# Patient Record
Sex: MALE | Race: WHITE | NOT HISPANIC OR LATINO | Employment: OTHER | URBAN - METROPOLITAN AREA
[De-identification: names, ages, dates, MRNs, and addresses within clinical notes are randomized per-mention and may not be internally consistent; named-entity substitution may affect disease eponyms.]

---

## 2019-01-07 ENCOUNTER — TELEPHONE (OUTPATIENT)
Dept: GASTROENTEROLOGY | Facility: CLINIC | Age: 64
End: 2019-01-07

## 2019-01-07 NOTE — TELEPHONE ENCOUNTER
----- Message from Robbin Thompson sent at 1/7/2019 11:22 AM CST -----  Contact: Arpita's wife  She is calling in regards to whether or not a referral was received ,please advise [phone]

## 2019-01-09 ENCOUNTER — LAB VISIT (OUTPATIENT)
Dept: LAB | Facility: HOSPITAL | Age: 64
End: 2019-01-09
Attending: INTERNAL MEDICINE
Payer: COMMERCIAL

## 2019-01-09 ENCOUNTER — OFFICE VISIT (OUTPATIENT)
Dept: GASTROENTEROLOGY | Facility: CLINIC | Age: 64
End: 2019-01-09
Payer: COMMERCIAL

## 2019-01-09 VITALS
SYSTOLIC BLOOD PRESSURE: 136 MMHG | HEART RATE: 84 BPM | DIASTOLIC BLOOD PRESSURE: 80 MMHG | BODY MASS INDEX: 30.09 KG/M2 | WEIGHT: 227.06 LBS | HEIGHT: 73 IN

## 2019-01-09 DIAGNOSIS — K74.69 OTHER CIRRHOSIS OF LIVER: ICD-10-CM

## 2019-01-09 DIAGNOSIS — K74.69 OTHER CIRRHOSIS OF LIVER: Primary | ICD-10-CM

## 2019-01-09 PROBLEM — K70.30 ALCOHOLIC CIRRHOSIS OF LIVER WITHOUT ASCITES: Status: ACTIVE | Noted: 2019-01-09

## 2019-01-09 LAB
ALBUMIN SERPL BCP-MCNC: 4.3 G/DL
ALP SERPL-CCNC: 64 U/L
ALT SERPL W/O P-5'-P-CCNC: 25 U/L
ANION GAP SERPL CALC-SCNC: 10 MMOL/L
AST SERPL-CCNC: 27 U/L
BASOPHILS # BLD AUTO: 0.04 K/UL
BASOPHILS NFR BLD: 1 %
BILIRUB SERPL-MCNC: 1.3 MG/DL
BUN SERPL-MCNC: 18 MG/DL
CALCIUM SERPL-MCNC: 10 MG/DL
CHLORIDE SERPL-SCNC: 100 MMOL/L
CO2 SERPL-SCNC: 29 MMOL/L
CREAT SERPL-MCNC: 1.1 MG/DL
DIFFERENTIAL METHOD: ABNORMAL
EOSINOPHIL # BLD AUTO: 0.1 K/UL
EOSINOPHIL NFR BLD: 2.1 %
ERYTHROCYTE [DISTWIDTH] IN BLOOD BY AUTOMATED COUNT: 13.4 %
EST. GFR  (AFRICAN AMERICAN): >60 ML/MIN/1.73 M^2
EST. GFR  (NON AFRICAN AMERICAN): >60 ML/MIN/1.73 M^2
GLUCOSE SERPL-MCNC: 114 MG/DL
HCT VFR BLD AUTO: 43.5 %
HGB BLD-MCNC: 13.8 G/DL
IMM GRANULOCYTES # BLD AUTO: 0.02 K/UL
IMM GRANULOCYTES NFR BLD AUTO: 0.5 %
INR PPP: 1
LYMPHOCYTES # BLD AUTO: 1.6 K/UL
LYMPHOCYTES NFR BLD: 37.3 %
MCH RBC QN AUTO: 29.6 PG
MCHC RBC AUTO-ENTMCNC: 31.7 G/DL
MCV RBC AUTO: 93 FL
MONOCYTES # BLD AUTO: 0.3 K/UL
MONOCYTES NFR BLD: 7.6 %
NEUTROPHILS # BLD AUTO: 2.2 K/UL
NEUTROPHILS NFR BLD: 51.5 %
NRBC BLD-RTO: 0 /100 WBC
PLATELET # BLD AUTO: 116 K/UL
PMV BLD AUTO: 11.7 FL
POTASSIUM SERPL-SCNC: 4.2 MMOL/L
PROT SERPL-MCNC: 7.6 G/DL
PROTHROMBIN TIME: 10.4 SEC
RBC # BLD AUTO: 4.67 M/UL
SODIUM SERPL-SCNC: 139 MMOL/L
WBC # BLD AUTO: 4.21 K/UL

## 2019-01-09 PROCEDURE — 99999 PR PBB SHADOW E&M-EST. PATIENT-LVL III: ICD-10-PCS | Mod: PBBFAC,,, | Performed by: INTERNAL MEDICINE

## 2019-01-09 PROCEDURE — 86790 VIRUS ANTIBODY NOS: CPT

## 2019-01-09 PROCEDURE — 85610 PROTHROMBIN TIME: CPT

## 2019-01-09 PROCEDURE — 36415 COLL VENOUS BLD VENIPUNCTURE: CPT

## 2019-01-09 PROCEDURE — 99204 PR OFFICE/OUTPT VISIT, NEW, LEVL IV, 45-59 MIN: ICD-10-PCS | Mod: S$GLB,,, | Performed by: INTERNAL MEDICINE

## 2019-01-09 PROCEDURE — 99204 OFFICE O/P NEW MOD 45 MIN: CPT | Mod: S$GLB,,, | Performed by: INTERNAL MEDICINE

## 2019-01-09 PROCEDURE — 85025 COMPLETE CBC W/AUTO DIFF WBC: CPT

## 2019-01-09 PROCEDURE — 3008F PR BODY MASS INDEX (BMI) DOCUMENTED: ICD-10-PCS | Mod: CPTII,S$GLB,, | Performed by: INTERNAL MEDICINE

## 2019-01-09 PROCEDURE — 87340 HEPATITIS B SURFACE AG IA: CPT

## 2019-01-09 PROCEDURE — 80053 COMPREHEN METABOLIC PANEL: CPT

## 2019-01-09 PROCEDURE — 3008F BODY MASS INDEX DOCD: CPT | Mod: CPTII,S$GLB,, | Performed by: INTERNAL MEDICINE

## 2019-01-09 PROCEDURE — 82105 ALPHA-FETOPROTEIN SERUM: CPT

## 2019-01-09 PROCEDURE — 99999 PR PBB SHADOW E&M-EST. PATIENT-LVL III: CPT | Mod: PBBFAC,,, | Performed by: INTERNAL MEDICINE

## 2019-01-09 PROCEDURE — 86706 HEP B SURFACE ANTIBODY: CPT

## 2019-01-09 PROCEDURE — 86803 HEPATITIS C AB TEST: CPT

## 2019-01-09 RX ORDER — TRAZODONE HYDROCHLORIDE 150 MG/1
150 TABLET ORAL NIGHTLY
COMMUNITY
End: 2019-07-24 | Stop reason: DRUGHIGH

## 2019-01-09 RX ORDER — SERTRALINE HYDROCHLORIDE 100 MG/1
100 TABLET, FILM COATED ORAL 2 TIMES DAILY
COMMUNITY

## 2019-01-09 RX ORDER — LOSARTAN POTASSIUM 50 MG/1
50 TABLET ORAL DAILY
COMMUNITY

## 2019-01-09 RX ORDER — SIMVASTATIN 40 MG/1
40 TABLET, FILM COATED ORAL NIGHTLY
COMMUNITY

## 2019-01-09 RX ORDER — AMOXICILLIN 500 MG
CAPSULE ORAL DAILY
COMMUNITY

## 2019-01-09 NOTE — PROGRESS NOTES
Subjective:     Mike Cobos is here for evaluation of Cirrhosis      HPI  Mike Cobos here for evaluation and management of cirrhosis.  Has risk factors for both BERENICE and VALLE. Reports DM well controlled. Also with cholesterol issues. Quit EtOH 20 years ago. Overall main issue is fatigue but otherwise ok.    No evidence of liver decompensation: no ascites, confusion or GI bleeding.    Reports having US recently      Outside records reviewed    Upper endoscopy from August 2017 shows normal EGD.  No sign of varices    Colonoscopy from August 2017 shows for ascending and 1 transverse colon polyp all removed.  The largest was 6 mm.    Review of Systems   Constitutional: Positive for fatigue.   HENT: Negative.    Eyes: Negative.    Respiratory: Negative.    Cardiovascular: Negative.    Gastrointestinal: Negative.    Genitourinary: Negative.    Musculoskeletal: Negative.    Skin: Negative.    Neurological: Negative.    Psychiatric/Behavioral: Negative.        Objective:     Physical Exam   Constitutional: He is oriented to person, place, and time. He appears well-developed and well-nourished. No distress.   HENT:   Head: Normocephalic and atraumatic.   Mouth/Throat: Oropharynx is clear and moist. No oropharyngeal exudate.   Eyes: Conjunctivae are normal. Pupils are equal, round, and reactive to light. Right eye exhibits no discharge. Left eye exhibits no discharge. No scleral icterus.   Pulmonary/Chest: Effort normal and breath sounds normal. No respiratory distress. He has no wheezes.   Abdominal: Soft. He exhibits no distension. There is no tenderness.   Musculoskeletal: He exhibits no edema.   Neurological: He is alert and oriented to person, place, and time.   Psychiatric: He has a normal mood and affect. His behavior is normal.   Vitals reviewed.      Computed MELD-Na score unavailable. Necessary lab results were not found in the last year.  Computed MELD score unavailable. Necessary lab results were not  found in the last year.    No results found for: WBC, HGB, HCT, PLT  No results found for: BUN, CREATININE, GLU, CALCIUM, NA, K, CL, MG  No results found for: AST, ALT, ALKPHOS, BILITOT, ALBUMIN  No results found for: INR      Assessment/Plan:     1. Other cirrhosis of liver      Mike Cobos is a 63 y.o. male withCirrhosis    Other cirrhosis of liver- likely VALLE +/- BERENICE although EtOH was remote. Appears compensated  -Will eval MELD score  -Discussed, cirrhosis, management and prognosis  -Need to get copy of outside US  -     Comprehensive metabolic panel; Future; Expected date: 01/09/2019  -     CBC auto differential; Future; Expected date: 01/09/2019  -     AFP tumor marker; Future; Expected date: 01/09/2019  -     Protime-INR; Future; Expected date: 01/09/2019  -     Hepatitis B surface antibody; Future; Expected date: 01/09/2019  -     Hepatitis A antibody, IgG; Future; Expected date: 01/09/2019  -     Hepatitis B surface antigen; Future; Expected date: 01/09/2019  -     Hepatitis C antibody; Future; Expected date: 01/09/2019  -     Comprehensive metabolic panel; Future; Expected date: 01/09/2019  -     CBC auto differential; Future; Expected date: 01/09/2019  -     AFP tumor marker; Future; Expected date: 01/09/2019  -     Protime-INR; Future; Expected date: 01/09/2019  -     US Abdomen Limited; Future; Expected date: 01/09/2019    RTC in 6 months with preclinic labs and imaging      Erinn Dorantes MD

## 2019-01-10 ENCOUNTER — TELEPHONE (OUTPATIENT)
Dept: GASTROENTEROLOGY | Facility: CLINIC | Age: 64
End: 2019-01-10

## 2019-01-10 LAB
AFP SERPL-MCNC: 3.4 NG/ML
HBV SURFACE AB SER-ACNC: NEGATIVE M[IU]/ML
HBV SURFACE AG SERPL QL IA: NEGATIVE
HCV AB SERPL QL IA: NEGATIVE
HEPATITIS A ANTIBODY, IGG: NEGATIVE

## 2019-01-10 NOTE — TELEPHONE ENCOUNTER
Returned call to [facility] and clarified patient is having an ultrasound of the abdomen. Patient was schedule.

## 2019-01-10 NOTE — TELEPHONE ENCOUNTER
----- Message from Zandra Silver sent at 1/10/2019  9:29 AM CST -----  Contact: Dr Yang([facility])-[phone]  Would like to consult with nurse regarding office notes for this patient on 01-09-19, please call back at [phone]. Thanks/ar

## 2019-01-23 ENCOUNTER — PATIENT MESSAGE (OUTPATIENT)
Dept: TRANSPLANT | Facility: CLINIC | Age: 64
End: 2019-01-23

## 2019-01-23 DIAGNOSIS — D64.9 NORMOCYTIC ANEMIA: Primary | ICD-10-CM

## 2019-01-25 ENCOUNTER — TELEPHONE (OUTPATIENT)
Dept: TRANSPLANT | Facility: CLINIC | Age: 64
End: 2019-01-25

## 2019-01-30 ENCOUNTER — TELEPHONE (OUTPATIENT)
Dept: GASTROENTEROLOGY | Facility: CLINIC | Age: 64
End: 2019-01-30

## 2019-01-30 NOTE — TELEPHONE ENCOUNTER
Called pt and spoke to his wife - told that Dr. Dorantes had received his recent U/S and it showed his Cirrhosis, but no concerning lesions.  He is going to get his Hep A and B vaccines in [place].  Will scan U/S result into chart.

## 2019-02-05 ENCOUNTER — TELEPHONE (OUTPATIENT)
Dept: GASTROENTEROLOGY | Facility: CLINIC | Age: 64
End: 2019-02-05

## 2019-02-05 NOTE — TELEPHONE ENCOUNTER
----- Message from Karen Johnson sent at 2/5/2019  8:37 AM CST -----  Contact: [place]/Dr. Juan's office  [place] called and stated they referred this patient and she requested notes on 1/9/19 and she never received them. She would like the office notes for this patient.    Fax number [phone].    She can be contacted at [phone].    Thanks,  Karen

## 2019-04-29 ENCOUNTER — TELEPHONE (OUTPATIENT)
Dept: GASTROENTEROLOGY | Facility: CLINIC | Age: 64
End: 2019-04-29

## 2019-04-29 NOTE — TELEPHONE ENCOUNTER
callled and spoke to wife appt rescheduled    ----- Message from Bayron Mejia sent at 4/29/2019  3:59 PM CDT -----  Contact: [facility]-[phone]  Would like a nurse to contact her regarding change in her  appointment, please contact her @[phone]. Thanks

## 2019-06-13 ENCOUNTER — TELEPHONE (OUTPATIENT)
Dept: GASTROENTEROLOGY | Facility: CLINIC | Age: 64
End: 2019-06-13

## 2019-06-13 DIAGNOSIS — K74.69 OTHER CIRRHOSIS OF LIVER: Primary | ICD-10-CM

## 2019-06-13 NOTE — TELEPHONE ENCOUNTER
Wife phoned requests that labs be done same day as appt on July 24 because they are driving from far away. She also requested an outside order for an ultrasound because they always have one done before their appointment locally. Can you change the lab orders to stat, and does he need u/s this time?     Thank you.

## 2019-06-20 ENCOUNTER — TELEPHONE (OUTPATIENT)
Dept: GASTROENTEROLOGY | Facility: CLINIC | Age: 64
End: 2019-06-20

## 2019-06-20 DIAGNOSIS — K74.69 OTHER CIRRHOSIS OF LIVER: Primary | ICD-10-CM

## 2019-06-20 NOTE — TELEPHONE ENCOUNTER
Patient needs outside order for ultrasound that is needed mid July  so that he can have done at [facility] phone number [phone], fax [phone]    ----- Message from RT Catracho sent at 6/20/2019 10:11 AM CDT -----  Contact: Radiology  Patient wife said he needs to get US test done in [facility], because patient can not fast that long. Please call patient back to discuss.

## 2019-06-26 ENCOUNTER — TELEPHONE (OUTPATIENT)
Dept: GASTROENTEROLOGY | Facility: CLINIC | Age: 64
End: 2019-06-26

## 2019-06-26 NOTE — TELEPHONE ENCOUNTER
U/s scheduled 7/16----- Message from Griselda Greenfield sent at 6/26/2019 10:12 AM CDT -----  Contact: Kathrine Chandrika   States she's calling regarding an US for his liver, before his appt with Dr Dorantes. He would like to have the US done [facility], [phone]. Please call Kathrine Cobos at [phone]. Thank you

## 2019-07-24 ENCOUNTER — LAB VISIT (OUTPATIENT)
Dept: LAB | Facility: HOSPITAL | Age: 64
End: 2019-07-24
Attending: INTERNAL MEDICINE
Payer: COMMERCIAL

## 2019-07-24 ENCOUNTER — OFFICE VISIT (OUTPATIENT)
Dept: GASTROENTEROLOGY | Facility: CLINIC | Age: 64
End: 2019-07-24
Attending: INTERNAL MEDICINE
Payer: COMMERCIAL

## 2019-07-24 VITALS
DIASTOLIC BLOOD PRESSURE: 62 MMHG | HEART RATE: 60 BPM | BODY MASS INDEX: 29.51 KG/M2 | SYSTOLIC BLOOD PRESSURE: 118 MMHG | WEIGHT: 222.69 LBS | HEIGHT: 73 IN

## 2019-07-24 DIAGNOSIS — R11.0 NAUSEA: ICD-10-CM

## 2019-07-24 DIAGNOSIS — K74.69 OTHER CIRRHOSIS OF LIVER: Primary | ICD-10-CM

## 2019-07-24 DIAGNOSIS — K74.69 OTHER CIRRHOSIS OF LIVER: ICD-10-CM

## 2019-07-24 DIAGNOSIS — D64.9 NORMOCYTIC ANEMIA: ICD-10-CM

## 2019-07-24 LAB
AFP SERPL-MCNC: 3.8 NG/ML (ref 0–8.4)
ALBUMIN SERPL BCP-MCNC: 4.3 G/DL (ref 3.5–5.2)
ALP SERPL-CCNC: 61 U/L (ref 55–135)
ALT SERPL W/O P-5'-P-CCNC: 29 U/L (ref 10–44)
ANION GAP SERPL CALC-SCNC: 9 MMOL/L (ref 8–16)
AST SERPL-CCNC: 28 U/L (ref 10–40)
BASOPHILS # BLD AUTO: 0.02 K/UL (ref 0–0.2)
BASOPHILS NFR BLD: 0.4 % (ref 0–1.9)
BILIRUB SERPL-MCNC: 1.4 MG/DL (ref 0.1–1)
BUN SERPL-MCNC: 19 MG/DL (ref 8–23)
CALCIUM SERPL-MCNC: 10.4 MG/DL (ref 8.7–10.5)
CHLORIDE SERPL-SCNC: 102 MMOL/L (ref 95–110)
CO2 SERPL-SCNC: 28 MMOL/L (ref 23–29)
CREAT SERPL-MCNC: 1.1 MG/DL (ref 0.5–1.4)
DIFFERENTIAL METHOD: ABNORMAL
EOSINOPHIL # BLD AUTO: 0.1 K/UL (ref 0–0.5)
EOSINOPHIL NFR BLD: 1 % (ref 0–8)
ERYTHROCYTE [DISTWIDTH] IN BLOOD BY AUTOMATED COUNT: 13.5 % (ref 11.5–14.5)
EST. GFR  (AFRICAN AMERICAN): >60 ML/MIN/1.73 M^2
EST. GFR  (NON AFRICAN AMERICAN): >60 ML/MIN/1.73 M^2
FERRITIN SERPL-MCNC: 38 NG/ML (ref 20–300)
FOLATE SERPL-MCNC: 12.7 NG/ML (ref 4–24)
GLUCOSE SERPL-MCNC: 102 MG/DL (ref 70–110)
HCT VFR BLD AUTO: 44 % (ref 40–54)
HGB BLD-MCNC: 14.1 G/DL (ref 14–18)
IMM GRANULOCYTES # BLD AUTO: 0.01 K/UL (ref 0–0.04)
IMM GRANULOCYTES NFR BLD AUTO: 0.2 % (ref 0–0.5)
INR PPP: 1 (ref 0.8–1.2)
IRON SERPL-MCNC: 107 UG/DL (ref 45–160)
LYMPHOCYTES # BLD AUTO: 1.3 K/UL (ref 1–4.8)
LYMPHOCYTES NFR BLD: 26.1 % (ref 18–48)
MCH RBC QN AUTO: 30.1 PG (ref 27–31)
MCHC RBC AUTO-ENTMCNC: 32 G/DL (ref 32–36)
MCV RBC AUTO: 94 FL (ref 82–98)
MONOCYTES # BLD AUTO: 0.5 K/UL (ref 0.3–1)
MONOCYTES NFR BLD: 9 % (ref 4–15)
NEUTROPHILS # BLD AUTO: 3.2 K/UL (ref 1.8–7.7)
NEUTROPHILS NFR BLD: 63.5 % (ref 38–73)
NRBC BLD-RTO: 0 /100 WBC
PLATELET # BLD AUTO: 133 K/UL (ref 150–350)
PMV BLD AUTO: 10.1 FL (ref 9.2–12.9)
POTASSIUM SERPL-SCNC: 4.1 MMOL/L (ref 3.5–5.1)
PROT SERPL-MCNC: 7.5 G/DL (ref 6–8.4)
PROTHROMBIN TIME: 10.4 SEC (ref 9–12.5)
RBC # BLD AUTO: 4.68 M/UL (ref 4.6–6.2)
SATURATED IRON: 27 % (ref 20–50)
SODIUM SERPL-SCNC: 139 MMOL/L (ref 136–145)
TOTAL IRON BINDING CAPACITY: 391 UG/DL (ref 250–450)
TRANSFERRIN SERPL-MCNC: 264 MG/DL (ref 200–375)
VIT B12 SERPL-MCNC: 319 PG/ML (ref 210–950)
WBC # BLD AUTO: 5.1 K/UL (ref 3.9–12.7)

## 2019-07-24 PROCEDURE — 99213 PR OFFICE/OUTPT VISIT, EST, LEVL III, 20-29 MIN: ICD-10-PCS | Mod: S$GLB,,, | Performed by: INTERNAL MEDICINE

## 2019-07-24 PROCEDURE — 80053 COMPREHEN METABOLIC PANEL: CPT

## 2019-07-24 PROCEDURE — 82746 ASSAY OF FOLIC ACID SERUM: CPT

## 2019-07-24 PROCEDURE — 99999 PR PBB SHADOW E&M-EST. PATIENT-LVL III: CPT | Mod: PBBFAC,,, | Performed by: INTERNAL MEDICINE

## 2019-07-24 PROCEDURE — 36415 COLL VENOUS BLD VENIPUNCTURE: CPT

## 2019-07-24 PROCEDURE — 82728 ASSAY OF FERRITIN: CPT

## 2019-07-24 PROCEDURE — 99999 PR PBB SHADOW E&M-EST. PATIENT-LVL III: ICD-10-PCS | Mod: PBBFAC,,, | Performed by: INTERNAL MEDICINE

## 2019-07-24 PROCEDURE — 82607 VITAMIN B-12: CPT

## 2019-07-24 PROCEDURE — 3008F BODY MASS INDEX DOCD: CPT | Mod: CPTII,S$GLB,, | Performed by: INTERNAL MEDICINE

## 2019-07-24 PROCEDURE — 85610 PROTHROMBIN TIME: CPT

## 2019-07-24 PROCEDURE — 83540 ASSAY OF IRON: CPT

## 2019-07-24 PROCEDURE — 85025 COMPLETE CBC W/AUTO DIFF WBC: CPT

## 2019-07-24 PROCEDURE — 3008F PR BODY MASS INDEX (BMI) DOCUMENTED: ICD-10-PCS | Mod: CPTII,S$GLB,, | Performed by: INTERNAL MEDICINE

## 2019-07-24 PROCEDURE — 99213 OFFICE O/P EST LOW 20 MIN: CPT | Mod: S$GLB,,, | Performed by: INTERNAL MEDICINE

## 2019-07-24 PROCEDURE — 82105 ALPHA-FETOPROTEIN SERUM: CPT

## 2019-07-24 RX ORDER — ONDANSETRON 8 MG/1
8 TABLET, ORALLY DISINTEGRATING ORAL DAILY PRN
Qty: 12 TABLET | Refills: 2 | Status: SHIPPED | OUTPATIENT
Start: 2019-07-24

## 2019-07-24 RX ORDER — AZELASTINE HYDROCHLORIDE, FLUTICASONE PROPIONATE 137; 50 UG/1; UG/1
1 SPRAY, METERED NASAL 2 TIMES DAILY
COMMUNITY

## 2019-07-24 RX ORDER — CLONAZEPAM 0.25 MG/1
TABLET, ORALLY DISINTEGRATING ORAL
COMMUNITY

## 2019-07-24 RX ORDER — ASPIRIN 81 MG/1
81 TABLET ORAL DAILY
COMMUNITY
End: 2022-07-12

## 2019-07-24 RX ORDER — CHOLECALCIFEROL (VITAMIN D3) 25 MCG
2000 TABLET ORAL DAILY
COMMUNITY

## 2019-07-24 NOTE — PROGRESS NOTES
Subjective:     Mike Cobos is here for follow up of cirrhosis.    HPI  Since Mike Cobos's last visit the main issues have been:  Previous issues with nausea have improved since last visit.  He reports changing his diet.  He is eating less greasy and fatty foods.  He has also lost 5 lb since last visit.  He does still occasionally have nausea but is very rare, episodic.    Ascites-none  Encephalopathy-none  GI bleeding-none    Outside ultra scan reviewed 7/16/2019    Diffuse fibrofatty infiltration of the liver with no focal liver abnormality, sludge and stones within the gallbladder lumen.    Review of Systems    Objective:     Physical Exam   Constitutional: He is oriented to person, place, and time. He appears well-developed and well-nourished. No distress.   HENT:   Head: Normocephalic and atraumatic.   Mouth/Throat: Oropharynx is clear and moist. No oropharyngeal exudate.   Eyes: Pupils are equal, round, and reactive to light. Conjunctivae are normal. Right eye exhibits no discharge. Left eye exhibits no discharge. No scleral icterus.   Pulmonary/Chest: Effort normal and breath sounds normal. No respiratory distress. He has no wheezes.   Abdominal: Soft. He exhibits no distension. There is no tenderness.   Musculoskeletal: He exhibits no edema.   Neurological: He is alert and oriented to person, place, and time.   Psychiatric: He has a normal mood and affect. His behavior is normal.   Vitals reviewed.      MELD-Na score: 9 at 7/24/2019 10:59 AM  MELD score: 9 at 7/24/2019 10:59 AM  Calculated from:  Serum Creatinine: 1.1 mg/dL at 7/24/2019 10:59 AM  Serum Sodium: 139 mmol/L (Rounded to 137 mmol/L) at 7/24/2019 10:59 AM  Total Bilirubin: 1.4 mg/dL at 7/24/2019 10:59 AM  INR(ratio): 1.0 at 7/24/2019 10:59 AM  Age: 64 years    WBC   Date Value Ref Range Status   07/24/2019 5.10 3.90 - 12.70 K/uL Final     Hemoglobin   Date Value Ref Range Status   07/24/2019 14.1 14.0 - 18.0 g/dL Final      Hematocrit   Date Value Ref Range Status   07/24/2019 44.0 40.0 - 54.0 % Final     Platelets   Date Value Ref Range Status   07/24/2019 133 (L) 150 - 350 K/uL Final     BUN, Bld   Date Value Ref Range Status   07/24/2019 19 8 - 23 mg/dL Final     Creatinine   Date Value Ref Range Status   07/24/2019 1.1 0.5 - 1.4 mg/dL Final     Glucose   Date Value Ref Range Status   07/24/2019 102 70 - 110 mg/dL Final     Calcium   Date Value Ref Range Status   07/24/2019 10.4 8.7 - 10.5 mg/dL Final     Sodium   Date Value Ref Range Status   07/24/2019 139 136 - 145 mmol/L Final     Potassium   Date Value Ref Range Status   07/24/2019 4.1 3.5 - 5.1 mmol/L Final     Chloride   Date Value Ref Range Status   07/24/2019 102 95 - 110 mmol/L Final     AST   Date Value Ref Range Status   07/24/2019 28 10 - 40 U/L Final     ALT   Date Value Ref Range Status   07/24/2019 29 10 - 44 U/L Final     Alkaline Phosphatase   Date Value Ref Range Status   07/24/2019 61 55 - 135 U/L Final     Total Bilirubin   Date Value Ref Range Status   07/24/2019 1.4 (H) 0.1 - 1.0 mg/dL Final     Comment:     For infants and newborns, interpretation of results should be based  on gestational age, weight and in agreement with clinical  observations.  Premature Infant recommended reference ranges:  Up to 24 hours.............<8.0 mg/dL  Up to 48 hours............<12.0 mg/dL  3-5 days..................<15.0 mg/dL  6-29 days.................<15.0 mg/dL       Albumin   Date Value Ref Range Status   07/24/2019 4.3 3.5 - 5.2 g/dL Final     INR   Date Value Ref Range Status   07/24/2019 1.0 0.8 - 1.2 Final     Comment:     Coumadin Therapy:  2.0 - 3.0 for INR for all indicators except mechanical heart valves  and antiphospholipid syndromes which should use 2.5 - 3.5.           Assessment/Plan:     1. Other cirrhosis of liver    2. Nausea      Mike Cobos is a 64 y.o. male withCirrhosis    Cirrhosis-low meld, well compensated  -Continue to monitor MELD  labs  -Continue HCC surveillance-can be done locally prior to next visit  -Continue variceal surveillance-8/2020    Nausea-nonspecific may be related to gallbladder but could also be related to reflux.  Overall this seems to be improving.  -advised to continue to monitor symptoms  -can take PPI is started to become more regular but now it is episodic  -Zofran as needed for nausea    Return to clinic in 6 months with preclinic labs and imaging    Erinn Dorantes MD

## 2020-01-20 ENCOUNTER — TELEPHONE (OUTPATIENT)
Dept: GASTROENTEROLOGY | Facility: CLINIC | Age: 65
End: 2020-01-20

## 2020-01-20 NOTE — TELEPHONE ENCOUNTER
Spoke with patient wife Kathrine and made her aware that I would fax patient ultrasound orders to [facility] in [place], [place]. Kathrine verbalized understanding.     Orders faxed to [phone].

## 2020-01-20 NOTE — TELEPHONE ENCOUNTER
----- Message from Stella Rodríguez sent at 1/20/2020 11:11 AM CST -----  Contact: Lis Scheduling  Requesting an order for US of liver. Patient is scheduled for 1/21/20. Please call back at [phone]. Please  Fax order to [phone].    Thanks,  Stella Rodríguez

## 2020-01-23 ENCOUNTER — DOCUMENTATION ONLY (OUTPATIENT)
Dept: GASTROENTEROLOGY | Facility: CLINIC | Age: 65
End: 2020-01-23

## 2020-01-24 NOTE — PROGRESS NOTES
Outside ultrasound reviewed from January 21, 2020 shows diffuse fibrofatty infiltration of the liver.  Stones and sludge within the gallbladder.  No focal liver abnormalities.

## 2020-01-29 ENCOUNTER — TELEPHONE (OUTPATIENT)
Dept: GASTROENTEROLOGY | Facility: CLINIC | Age: 65
End: 2020-01-29

## 2020-01-29 ENCOUNTER — LAB VISIT (OUTPATIENT)
Dept: LAB | Facility: HOSPITAL | Age: 65
End: 2020-01-29
Attending: INTERNAL MEDICINE
Payer: COMMERCIAL

## 2020-01-29 ENCOUNTER — OFFICE VISIT (OUTPATIENT)
Dept: GASTROENTEROLOGY | Facility: CLINIC | Age: 65
End: 2020-01-29
Payer: COMMERCIAL

## 2020-01-29 VITALS
HEART RATE: 69 BPM | SYSTOLIC BLOOD PRESSURE: 148 MMHG | HEIGHT: 73 IN | DIASTOLIC BLOOD PRESSURE: 76 MMHG | BODY MASS INDEX: 30.24 KG/M2 | WEIGHT: 228.19 LBS

## 2020-01-29 DIAGNOSIS — K74.69 OTHER CIRRHOSIS OF LIVER: Primary | ICD-10-CM

## 2020-01-29 DIAGNOSIS — K74.69 OTHER CIRRHOSIS OF LIVER: ICD-10-CM

## 2020-01-29 LAB
AFP SERPL-MCNC: 3.6 NG/ML (ref 0–8.4)
ALBUMIN SERPL BCP-MCNC: 4.4 G/DL (ref 3.5–5.2)
ALP SERPL-CCNC: 58 U/L (ref 55–135)
ALT SERPL W/O P-5'-P-CCNC: 27 U/L (ref 10–44)
ANION GAP SERPL CALC-SCNC: 12 MMOL/L (ref 8–16)
AST SERPL-CCNC: 25 U/L (ref 10–40)
BASOPHILS # BLD AUTO: 0.02 K/UL (ref 0–0.2)
BASOPHILS NFR BLD: 0.5 % (ref 0–1.9)
BILIRUB SERPL-MCNC: 0.9 MG/DL (ref 0.1–1)
BUN SERPL-MCNC: 15 MG/DL (ref 8–23)
CALCIUM SERPL-MCNC: 10.2 MG/DL (ref 8.7–10.5)
CHLORIDE SERPL-SCNC: 102 MMOL/L (ref 95–110)
CO2 SERPL-SCNC: 26 MMOL/L (ref 23–29)
CREAT SERPL-MCNC: 1.2 MG/DL (ref 0.5–1.4)
DIFFERENTIAL METHOD: ABNORMAL
EOSINOPHIL # BLD AUTO: 0 K/UL (ref 0–0.5)
EOSINOPHIL NFR BLD: 0.2 % (ref 0–8)
ERYTHROCYTE [DISTWIDTH] IN BLOOD BY AUTOMATED COUNT: 13.4 % (ref 11.5–14.5)
EST. GFR  (AFRICAN AMERICAN): >60 ML/MIN/1.73 M^2
EST. GFR  (NON AFRICAN AMERICAN): >60 ML/MIN/1.73 M^2
GLUCOSE SERPL-MCNC: 174 MG/DL (ref 70–110)
HCT VFR BLD AUTO: 44.9 % (ref 40–54)
HGB BLD-MCNC: 14.7 G/DL (ref 14–18)
IMM GRANULOCYTES # BLD AUTO: 0.01 K/UL (ref 0–0.04)
IMM GRANULOCYTES NFR BLD AUTO: 0.2 % (ref 0–0.5)
INR PPP: 1 (ref 0.8–1.2)
LYMPHOCYTES # BLD AUTO: 0.8 K/UL (ref 1–4.8)
LYMPHOCYTES NFR BLD: 19.5 % (ref 18–48)
MCH RBC QN AUTO: 30.9 PG (ref 27–31)
MCHC RBC AUTO-ENTMCNC: 32.7 G/DL (ref 32–36)
MCV RBC AUTO: 95 FL (ref 82–98)
MONOCYTES # BLD AUTO: 0.2 K/UL (ref 0.3–1)
MONOCYTES NFR BLD: 5.7 % (ref 4–15)
NEUTROPHILS # BLD AUTO: 3 K/UL (ref 1.8–7.7)
NEUTROPHILS NFR BLD: 74.1 % (ref 38–73)
NRBC BLD-RTO: 0 /100 WBC
PLATELET # BLD AUTO: 122 K/UL (ref 150–350)
PMV BLD AUTO: 10.3 FL (ref 9.2–12.9)
POTASSIUM SERPL-SCNC: 4.4 MMOL/L (ref 3.5–5.1)
PROT SERPL-MCNC: 7.5 G/DL (ref 6–8.4)
PROTHROMBIN TIME: 10.4 SEC (ref 9–12.5)
RBC # BLD AUTO: 4.75 M/UL (ref 4.6–6.2)
SODIUM SERPL-SCNC: 140 MMOL/L (ref 136–145)
WBC # BLD AUTO: 4.05 K/UL (ref 3.9–12.7)

## 2020-01-29 PROCEDURE — 99213 OFFICE O/P EST LOW 20 MIN: CPT | Mod: S$GLB,,, | Performed by: INTERNAL MEDICINE

## 2020-01-29 PROCEDURE — 80053 COMPREHEN METABOLIC PANEL: CPT

## 2020-01-29 PROCEDURE — 85610 PROTHROMBIN TIME: CPT

## 2020-01-29 PROCEDURE — 99213 PR OFFICE/OUTPT VISIT, EST, LEVL III, 20-29 MIN: ICD-10-PCS | Mod: S$GLB,,, | Performed by: INTERNAL MEDICINE

## 2020-01-29 PROCEDURE — 3008F BODY MASS INDEX DOCD: CPT | Mod: CPTII,S$GLB,, | Performed by: INTERNAL MEDICINE

## 2020-01-29 PROCEDURE — 3008F PR BODY MASS INDEX (BMI) DOCUMENTED: ICD-10-PCS | Mod: CPTII,S$GLB,, | Performed by: INTERNAL MEDICINE

## 2020-01-29 PROCEDURE — 36415 COLL VENOUS BLD VENIPUNCTURE: CPT

## 2020-01-29 PROCEDURE — 82105 ALPHA-FETOPROTEIN SERUM: CPT

## 2020-01-29 PROCEDURE — 99999 PR PBB SHADOW E&M-EST. PATIENT-LVL III: ICD-10-PCS | Mod: PBBFAC,,, | Performed by: INTERNAL MEDICINE

## 2020-01-29 PROCEDURE — 85025 COMPLETE CBC W/AUTO DIFF WBC: CPT

## 2020-01-29 PROCEDURE — 99999 PR PBB SHADOW E&M-EST. PATIENT-LVL III: CPT | Mod: PBBFAC,,, | Performed by: INTERNAL MEDICINE

## 2020-01-29 RX ORDER — LORATADINE 10 MG/1
10 TABLET ORAL DAILY
COMMUNITY

## 2020-01-29 NOTE — PROGRESS NOTES
Subjective:     Mike Cobos is here for follow up of cirrhosis    HPI  Since Mike Cobos's last visit there have been no significant issues.  Overall feels well.    No evidence of liver decompensation: no ascites, confusion or GI bleeding.    He has not lost any more weight since last visit and is actually back to his baseline weight but it seems like his diabetes has become better controlled as his diabetes medication has been reduced.    Review of Systems    Objective:     Physical Exam   Constitutional: He is oriented to person, place, and time. He appears well-developed and well-nourished. No distress.   HENT:   Head: Normocephalic and atraumatic.   Mouth/Throat: Oropharynx is clear and moist. No oropharyngeal exudate.   Eyes: Pupils are equal, round, and reactive to light. Conjunctivae are normal. Right eye exhibits no discharge. Left eye exhibits no discharge. No scleral icterus.   Pulmonary/Chest: Effort normal and breath sounds normal. No respiratory distress. He has no wheezes.   Abdominal: Soft. He exhibits no distension. There is no tenderness.   Musculoskeletal: He exhibits no edema.   Neurological: He is alert and oriented to person, place, and time.   Psychiatric: He has a normal mood and affect. His behavior is normal.   Vitals reviewed.      MELD-Na score: 8 at 1/29/2020 12:33 PM  MELD score: 8 at 1/29/2020 12:33 PM  Calculated from:  Serum Creatinine: 1.2 mg/dL at 1/29/2020 12:33 PM  Serum Sodium: 140 mmol/L (Rounded to 137 mmol/L) at 1/29/2020 12:33 PM  Total Bilirubin: 0.9 mg/dL (Rounded to 1 mg/dL) at 1/29/2020 12:33 PM  INR(ratio): 1.0 at 1/29/2020 12:33 PM  Age: 64 years    WBC   Date Value Ref Range Status   01/29/2020 4.05 3.90 - 12.70 K/uL Final     Hemoglobin   Date Value Ref Range Status   01/29/2020 14.7 14.0 - 18.0 g/dL Final     Hematocrit   Date Value Ref Range Status   01/29/2020 44.9 40.0 - 54.0 % Final     Platelets   Date Value Ref Range Status   01/29/2020 122 (L)  150 - 350 K/uL Final     BUN, Bld   Date Value Ref Range Status   01/29/2020 15 8 - 23 mg/dL Final     Creatinine   Date Value Ref Range Status   01/29/2020 1.2 0.5 - 1.4 mg/dL Final     Glucose   Date Value Ref Range Status   01/29/2020 174 (H) 70 - 110 mg/dL Final     Calcium   Date Value Ref Range Status   01/29/2020 10.2 8.7 - 10.5 mg/dL Final     Sodium   Date Value Ref Range Status   01/29/2020 140 136 - 145 mmol/L Final     Potassium   Date Value Ref Range Status   01/29/2020 4.4 3.5 - 5.1 mmol/L Final     Chloride   Date Value Ref Range Status   01/29/2020 102 95 - 110 mmol/L Final     AST   Date Value Ref Range Status   01/29/2020 25 10 - 40 U/L Final     ALT   Date Value Ref Range Status   01/29/2020 27 10 - 44 U/L Final     Alkaline Phosphatase   Date Value Ref Range Status   01/29/2020 58 55 - 135 U/L Final     Total Bilirubin   Date Value Ref Range Status   01/29/2020 0.9 0.1 - 1.0 mg/dL Final     Comment:     For infants and newborns, interpretation of results should be based  on gestational age, weight and in agreement with clinical  observations.  Premature Infant recommended reference ranges:  Up to 24 hours.............<8.0 mg/dL  Up to 48 hours............<12.0 mg/dL  3-5 days..................<15.0 mg/dL  6-29 days.................<15.0 mg/dL       Albumin   Date Value Ref Range Status   01/29/2020 4.4 3.5 - 5.2 g/dL Final     INR   Date Value Ref Range Status   01/29/2020 1.0 0.8 - 1.2 Final     Comment:     Coumadin Therapy:  2.0 - 3.0 for INR for all indicators except mechanical heart valves  and antiphospholipid syndromes which should use 2.5 - 3.5.           Assessment/Plan:     1. Other cirrhosis of liver      Mike Cobos is a 64 y.o. male withCirrhosis      Cirrhosis- low MELD, well compensated; patient would like to continue to receive his liver care here  -Continue to monitor MELD labs  -Continue with HCC surveillance-reports having ultrasound last week, will get copy of outside  records; patient given external order for repeat ultrasound in 6 months  -Continue variceal screening-8/2020    RTC in 6 months with preclinic labs and imaging    Erinn Dorantes MD

## 2020-01-29 NOTE — TELEPHONE ENCOUNTER
Spoke with Floridalma at [facility] and requested patient ultrasound results from 01/21/2020.    Verified office fax number and per Floridalma results being faxed over.    none

## 2020-07-20 ENCOUNTER — TELEPHONE (OUTPATIENT)
Dept: GASTROENTEROLOGY | Facility: CLINIC | Age: 65
End: 2020-07-20

## 2020-07-20 NOTE — TELEPHONE ENCOUNTER
----- Message from Audelia Almaraz sent at 7/20/2020  1:04 PM CDT -----  [facility] is calling to speak with office to have pt order fax to office. If needed, Please call back at [phone] Fax [phone]

## 2020-07-29 ENCOUNTER — OFFICE VISIT (OUTPATIENT)
Dept: GASTROENTEROLOGY | Facility: CLINIC | Age: 65
End: 2020-07-29
Payer: COMMERCIAL

## 2020-07-29 DIAGNOSIS — K74.69 OTHER CIRRHOSIS OF LIVER: Primary | ICD-10-CM

## 2020-07-29 PROCEDURE — 99212 PR OFFICE/OUTPT VISIT, EST, LEVL II, 10-19 MIN: ICD-10-PCS | Mod: 95,,, | Performed by: INTERNAL MEDICINE

## 2020-07-29 PROCEDURE — 99212 OFFICE O/P EST SF 10 MIN: CPT | Mod: 95,,, | Performed by: INTERNAL MEDICINE

## 2020-07-29 NOTE — Clinical Note
Please send a copy of note patient's local gastroenterologist as he will continue his follow-up there in [place] with Dr. Yang.

## 2020-07-29 NOTE — PROGRESS NOTES
Subjective:     Mike Cobos is here for follow up of cirrhosis    Video visit    HPI  Since Mike Cobos's last visit there have been no significant issues.  Overall feels well.    No evidence of liver decompensation: no ascites, confusion or GI bleeding.    Outside labs reviewed from July 20, 2020 creatinine 1.16, sodium 142, potassium 4.2, albumin 4.6, total bilirubin 0.8, alkaline phosphatase 56, AST 26, ALT 25    Ultrasound from July 21, 2020 shows fatty changes in the liver with stones and sludge noted in the gallbladder.  Liver identified shows no focal lesions.      Review of Systems   Constitutional: Negative.    HENT: Negative.    Eyes: Negative.    Respiratory: Negative.    Cardiovascular: Negative.    Gastrointestinal: Negative.    Genitourinary: Negative.    Musculoskeletal: Negative.    Skin: Negative.    Neurological: Negative.    Psychiatric/Behavioral: Negative.        Objective:     Physical Exam    MELD-Na score: 8 at 1/29/2020 12:33 PM  MELD score: 8 at 1/29/2020 12:33 PM  Calculated from:  Serum Creatinine: 1.2 mg/dL at 1/29/2020 12:33 PM  Serum Sodium: 140 mmol/L (Rounded to 137 mmol/L) at 1/29/2020 12:33 PM  Total Bilirubin: 0.9 mg/dL (Rounded to 1 mg/dL) at 1/29/2020 12:33 PM  INR(ratio): 1.0 at 1/29/2020 12:33 PM  Age: 64 years 6 months    WBC   Date Value Ref Range Status   01/29/2020 4.05 3.90 - 12.70 K/uL Final     Hemoglobin   Date Value Ref Range Status   01/29/2020 14.7 14.0 - 18.0 g/dL Final     Hematocrit   Date Value Ref Range Status   01/29/2020 44.9 40.0 - 54.0 % Final     Platelets   Date Value Ref Range Status   01/29/2020 122 (L) 150 - 350 K/uL Final     BUN, Bld   Date Value Ref Range Status   01/29/2020 15 8 - 23 mg/dL Final     Creatinine   Date Value Ref Range Status   01/29/2020 1.2 0.5 - 1.4 mg/dL Final     Glucose   Date Value Ref Range Status   01/29/2020 174 (H) 70 - 110 mg/dL Final     Calcium   Date Value Ref Range Status   01/29/2020 10.2 8.7 - 10.5  mg/dL Final     Sodium   Date Value Ref Range Status   01/29/2020 140 136 - 145 mmol/L Final     Potassium   Date Value Ref Range Status   01/29/2020 4.4 3.5 - 5.1 mmol/L Final     Chloride   Date Value Ref Range Status   01/29/2020 102 95 - 110 mmol/L Final     AST   Date Value Ref Range Status   01/29/2020 25 10 - 40 U/L Final     ALT   Date Value Ref Range Status   01/29/2020 27 10 - 44 U/L Final     Alkaline Phosphatase   Date Value Ref Range Status   01/29/2020 58 55 - 135 U/L Final     Total Bilirubin   Date Value Ref Range Status   01/29/2020 0.9 0.1 - 1.0 mg/dL Final     Comment:     For infants and newborns, interpretation of results should be based  on gestational age, weight and in agreement with clinical  observations.  Premature Infant recommended reference ranges:  Up to 24 hours.............<8.0 mg/dL  Up to 48 hours............<12.0 mg/dL  3-5 days..................<15.0 mg/dL  6-29 days.................<15.0 mg/dL       Albumin   Date Value Ref Range Status   01/29/2020 4.4 3.5 - 5.2 g/dL Final     INR   Date Value Ref Range Status   01/29/2020 1.0 0.8 - 1.2 Final     Comment:     Coumadin Therapy:  2.0 - 3.0 for INR for all indicators except mechanical heart valves  and antiphospholipid syndromes which should use 2.5 - 3.5.           Assessment/Plan:     1. Other cirrhosis of liver      Mike Cobos is a 65 y.o. male withCirrhosis      Cirrhosis- well compensated; suspect meld score remains low as liver tests that make up the MELD score are unremarkable, the only lab not available was INR.  Patient has been doing very well and remaining compensated; therefore, I think he can follow-up with his local gastroenterologist until there is a significant increase in the meld score or clinical change.  -Continue to monitor MELD labs-repeat in 6 months  -Continue with HCC surveillance-repeat in 6 months  -Continue variceal screening-due for upper endoscopy now, can be done by his local  gastroenterologist.  Patient also believes he may be due for colonoscopy.    RTC with local gastroenterologist, and can return here as needed    The patient location is: [place]  The chief complaint leading to consultation is: Cirrhosis    Visit type: audiovisual    Face to Face time with patient: 15 minutes  25 minutes of total time spent on the encounter, which includes face to face time and non-face to face time preparing to see the patient (eg, review of tests), Obtaining and/or reviewing separately obtained history, Documenting clinical information in the electronic or other health record, Independently interpreting results (not separately reported) and communicating results to the patient/family/caregiver, or Care coordination (not separately reported).         Each patient to whom he or she provides medical services by telemedicine is:  (1) informed of the relationship between the physician and patient and the respective role of any other health care provider with respect to management of the patient; and (2) notified that he or she may decline to receive medical services by telemedicine and may withdraw from such care at any time.          Erinn Dorantes MD

## 2021-05-12 ENCOUNTER — PATIENT MESSAGE (OUTPATIENT)
Dept: RESEARCH | Facility: HOSPITAL | Age: 66
End: 2021-05-12

## 2022-05-11 ENCOUNTER — TELEPHONE (OUTPATIENT)
Dept: HEPATOLOGY | Facility: CLINIC | Age: 67
End: 2022-05-11
Payer: MEDICARE

## 2022-05-11 NOTE — TELEPHONE ENCOUNTER
----- Message from Chen Prieto MA sent at 5/11/2022  9:58 AM CDT -----  Contact: Kathrine, pt spouse, [phone]    ----- Message -----  From: Giselle Mcmullen  Sent: 5/11/2022   9:56 AM CDT  To: [facility] Staff    Called in requesting to schedule the soonest available appointment for established patient with cirrhosis. States there is a spot in liver that shows in CT. Please call.

## 2022-05-11 NOTE — TELEPHONE ENCOUNTER
Spoke with patient and his wife and scheduled an appointment for 05/25/2022 with ROSE Chatterjee.

## 2022-05-19 ENCOUNTER — TELEPHONE (OUTPATIENT)
Dept: HEPATOLOGY | Facility: CLINIC | Age: 67
End: 2022-05-19
Payer: MEDICARE

## 2022-05-19 NOTE — TELEPHONE ENCOUNTER
Returned Mrs. Cobos's call. She wanted to verify that her 's records have been received. Patient was informed per Kasia that records have been received.

## 2022-05-19 NOTE — TELEPHONE ENCOUNTER
----- Message from Katey Grider sent at 5/19/2022  4:06 PM CDT -----  Pt's spouse is requesting a call. Caller states that she some things that she need to discuss with nurse. Caller can be reached at  [phone] or [phone] (home)

## 2022-05-25 ENCOUNTER — OFFICE VISIT (OUTPATIENT)
Dept: HEPATOLOGY | Facility: CLINIC | Age: 67
End: 2022-05-25
Payer: COMMERCIAL

## 2022-05-25 DIAGNOSIS — K76.9 LIVER LESION: ICD-10-CM

## 2022-05-25 DIAGNOSIS — K74.60 HEPATIC CIRRHOSIS, UNSPECIFIED HEPATIC CIRRHOSIS TYPE, UNSPECIFIED WHETHER ASCITES PRESENT: Primary | ICD-10-CM

## 2022-05-25 PROCEDURE — 4010F PR ACE/ARB THEARPY RXD/TAKEN: ICD-10-PCS | Mod: CPTII,95,, | Performed by: NURSE PRACTITIONER

## 2022-05-25 PROCEDURE — 99214 OFFICE O/P EST MOD 30 MIN: CPT | Mod: 95,,, | Performed by: NURSE PRACTITIONER

## 2022-05-25 PROCEDURE — 99214 PR OFFICE/OUTPT VISIT, EST, LEVL IV, 30-39 MIN: ICD-10-PCS | Mod: 95,,, | Performed by: NURSE PRACTITIONER

## 2022-05-25 PROCEDURE — 4010F ACE/ARB THERAPY RXD/TAKEN: CPT | Mod: CPTII,95,, | Performed by: NURSE PRACTITIONER

## 2022-05-25 RX ORDER — DAPAGLIFLOZIN 10 MG/1
10 TABLET, FILM COATED ORAL DAILY
COMMUNITY
Start: 2022-05-23

## 2022-05-25 RX ORDER — MONTELUKAST SODIUM 10 MG/1
10 TABLET ORAL DAILY
COMMUNITY
Start: 2022-05-10

## 2022-05-25 RX ORDER — ORAL SEMAGLUTIDE 3 MG/1
3 TABLET ORAL DAILY
COMMUNITY
Start: 2022-05-20

## 2022-05-25 NOTE — PROGRESS NOTES
Clinic Follow Up:  [facility] Follow Up Note    Reason for Follow Up:  The primary encounter diagnosis was Hepatic cirrhosis, unspecified hepatic cirrhosis type, unspecified whether ascites present. A diagnosis of Liver lesion was also pertinent to this visit.    PCP: Primary Doctor No     The patient location is: [place]  The chief complaint leading to consultation is: above    Visit type: audiovisual    Face to Face time with patient: 25 minutes  40 minutes of total time spent on the encounter, which includes face to face time and non-face to face time preparing to see the patient (eg, review of tests), Obtaining and/or reviewing separately obtained history, Documenting clinical information in the electronic or other health record, Independently interpreting results (not separately reported) and communicating results to the patient/family/caregiver, or Care coordination (not separately reported).         Each patient to whom he or she provides medical services by telemedicine is:  (1) informed of the relationship between the physician and patient and the respective role of any other health care provider with respect to management of the patient; and (2) notified that he or she may decline to receive medical services by telemedicine and may withdraw from such care at any time.    Notes:       HPI:  This is a 66 y.o. male here for follow up of the above  Pt previously seen by Dr. Dorantes in 2020.  At that time, pt was well compensated with low MELD and was instructed to follow up with his local GI provider for continued management of his cirrhoisis.  Pt states that he has been following every 6 months and has remained well comprnsated  He reports that he was recently seen for follow up and an AFP and US were completed.  Pt reprots that AFP is 2.7.  I do not have the report for review.   US showed a new lesion in the liver.  Showed a small 1cm hypoechoic nodule in the mid-liver.   A CT with and  without contrast was then completed and showed a 6.1 X 6.2 cm enhancing heterogenous mass in the superior aspect of the right and left hepatic lobe.   Pt states that he has been feeling overall well without any complaints  No upper GI bleeding.  No ascites or BLE.  No overt confusion.      Review of Systems   Constitutional: Negative for chills, fever, malaise/fatigue and weight loss.   Respiratory: Negative for cough.    Cardiovascular: Negative for chest pain.   Gastrointestinal:        Per HPI   Musculoskeletal: Negative for myalgias.   Skin: Negative for itching and rash.   Neurological: Negative for headaches.   Psychiatric/Behavioral: The patient is not nervous/anxious.        Medical History:  Past Medical History:   Diagnosis Date    Alcohol abuse, in remission     Cirrhosis     Depression     DM (diabetes mellitus)     HTN (hypertension)     Hypercholesterolemia        Surgical History:   Past Surgical History:   Procedure Laterality Date    CORNEAL TRANSPLANT      SINUS SURGERY         Family History:   No family history on file.    Social History:   Social History     Tobacco Use    Smoking status: Never Smoker    Smokeless tobacco: Former User     Types: Snuff     Quit date: 1/9/1998   Substance Use Topics    Alcohol use: No     Comment: recovering alcholic    Drug use: No       Allergies: Reviewed    Home Medications:  Current Outpatient Medications on File Prior to Visit   Medication Sig Dispense Refill    aspirin (ECOTRIN) 81 MG EC tablet Take 81 mg by mouth once daily.      azelastine-fluticasone (DYMISTA) 137-50 mcg/spray Spry nassal spray 1 spray by Each Nare route 2 (two) times daily.      clonazePAM (KLONOPIN) 0.25 MG TbDL 1 tablet on the tongue and allow to dissolve      fish oil-omega-3 fatty acids 300-1,000 mg capsule Take by mouth once daily.      Lactobacillus rhamnosus GG (CULTURELLE) 10 billion cell capsule Take 1 capsule by mouth once daily.      losartan (COZAAR) 50 MG  tablet Take 50 mg by mouth once daily.      multivitamin capsule Take 1 capsule by mouth once daily.      ondansetron (ZOFRAN-ODT) 8 MG TbDL Take 1 tablet (8 mg total) by mouth daily as needed. 12 tablet 2    sertraline (ZOLOFT) 100 MG tablet Take 100 mg by mouth 2 (two) times daily.       simvastatin (ZOCOR) 40 MG tablet Take 40 mg by mouth every evening.      SITagliptan-metformin (JANUMET) 50-1,000 mg per tablet Take 1 tablet by mouth once daily.       trazodone HCl (TRAZODONE ORAL) Take 75 mg by mouth once daily.      turm/ging/magy/yuc/jon/gail/hor (TUMERSAID ORAL) Take by mouth.      FARXIGA 10 mg tablet Take 10 mg by mouth once daily.      loratadine (CLARITIN) 10 mg tablet Take 10 mg by mouth once daily.      montelukast (SINGULAIR) 10 mg tablet Take 10 mg by mouth once daily.      RYBELSUS 3 mg tablet Take 3 mg by mouth once daily.      vitamin D (VITAMIN D3) 1000 units Tab Take 2,000 Units by mouth once daily.       No current facility-administered medications on file prior to visit.       Physical Exam:  Vital Signs:  There were no vitals taken for this visit.  There is no height or weight on file to calculate BMI.  Physical Exam  Constitutional:       Appearance: He is well-developed.   HENT:      Head: Normocephalic.   Eyes:      General: No scleral icterus.  Pulmonary:      Effort: Pulmonary effort is normal.   Musculoskeletal:         General: Normal range of motion.      Cervical back: Normal range of motion.   Skin:     General: Skin is dry.   Neurological:      Mental Status: He is alert.         Labs: Pertinent labs reviewed.      Assessment:  1. Hepatic cirrhosis, unspecified hepatic cirrhosis type, unspecified whether ascites present    2. Liver lesion        Recommendations:  Hepatic cirrhosis, unspecified hepatic cirrhosis type, unspecified whether ascites present  Liver lesion  - long discussion with pt an wife on the possibility of HCC given the new lesion.   - AFP appears to be  normal  - will request recent labs for review  - will request a copy of the disc of the recent US and CT.  Once uploaded will plan for IR conference for review.  Pt may ultimately need additional imaging vs treatment of HCC  - Biopsy is not indicated at this time as HCC is most often diagnosed with imaging            Return to Clinic:  F/U after imaging reviewed in IR.

## 2022-05-26 ENCOUNTER — TELEPHONE (OUTPATIENT)
Dept: HEPATOLOGY | Facility: CLINIC | Age: 67
End: 2022-05-26
Payer: MEDICARE

## 2022-05-26 NOTE — TELEPHONE ENCOUNTER
----- Message from Katey Grider sent at 5/26/2022 11:01 AM CDT -----  Pt is requesting a call back in regards to needing address to where they need to send the disk. Pt can be reached at [phone] (sotz)

## 2022-05-31 ENCOUNTER — PATIENT MESSAGE (OUTPATIENT)
Dept: HEPATOLOGY | Facility: CLINIC | Age: 67
End: 2022-05-31
Payer: MEDICARE

## 2022-06-01 ENCOUNTER — TELEPHONE (OUTPATIENT)
Dept: HEPATOLOGY | Facility: CLINIC | Age: 67
End: 2022-06-01
Payer: MEDICARE

## 2022-06-01 NOTE — TELEPHONE ENCOUNTER
----- Message from Janie Santa sent at 6/1/2022  2:50 PM CDT -----  Regarding: Advice  Contact: Patient's wife-Kathrine  Please have Kasia to give patient's wife a call back concerning patient's disk , was it received. Please call to advise at Ph .[phone] (home)

## 2022-06-02 ENCOUNTER — TELEPHONE (OUTPATIENT)
Dept: HEPATOLOGY | Facility: CLINIC | Age: 67
End: 2022-06-02
Payer: MEDICARE

## 2022-06-02 NOTE — TELEPHONE ENCOUNTER
----- Message from Lien Summers sent at 6/2/2022  3:43 PM CDT -----  Pt's wife would like a call back to find out if paperwork and the disc were received in the office yet. Please call; .[phone]

## 2022-06-07 ENCOUNTER — TELEPHONE (OUTPATIENT)
Dept: HEPATOLOGY | Facility: CLINIC | Age: 67
End: 2022-06-07
Payer: MEDICARE

## 2022-06-07 NOTE — TELEPHONE ENCOUNTER
----- Message from Milena Kang MA sent at 6/7/2022  2:33 PM CDT -----  Contact: Kathrine (spouse)-[phone] or    ----- Message -----  From: Kwaku Coe  Sent: 6/7/2022   1:28 PM CDT  To: Jose E MARK Staff    Kathrine is requesting a callback from nurse Guerrero regarding her .    Callback number: Kathrine (spouse)-[phone]

## 2022-06-09 ENCOUNTER — TELEPHONE (OUTPATIENT)
Dept: HEPATOLOGY | Facility: CLINIC | Age: 67
End: 2022-06-09
Payer: MEDICARE

## 2022-06-09 NOTE — TELEPHONE ENCOUNTER
Patient: Mike Cobos       MRN: [id number]      : 1955     Age: 66 y.o.  [address] [id number]    Providers  Hepatologists: Erinn Dorantes MD  Surgeons: NA  Radiologists: JOE  Advanced Practice providers: MURALI Chatterjee    Priority of review: Cancer    Patient Transplant Status: Other NA    Reason for presentation: Indeterminate lesion    Clinical Summary: He reports that he was recently seen for follow up and an AFP and US were completed.  Pt reprots that AFP is 2.7.  I do not have the report for review.   US showed a new lesion in the liver.  Showed a small 1cm hypoechoic nodule in the mid-liver.   A CT with and without contrast was then completed and showed a 6.1 X 6.2 cm enhancing heterogenous mass in the superior aspect of the right and left hepatic lobe.   Pt states that he has been feeling overall well without any complaints    Imaging to be reviewed: outside images recently uploaded   HCC Treatment History: NA    ABO:     Platelets:   Lab Results   Component Value Date/Time     (L) 2020 12:33 PM     Creatinine:   Lab Results   Component Value Date/Time    CREATININE 1.2 2020 12:33 PM     Bilirubin:   Lab Results   Component Value Date/Time    BILITOT 0.9 2020 12:33 PM     AFP Last 3 each if available:   Lab Results   Component Value Date/Time    AFP 3.6 2020 12:33 PM    AFP 3.8 2019 10:59 AM    AFP 3.4 2019 12:32 PM       MELD: Computed MELD-Na score unavailable. Necessary lab results were not found in the last year.  Computed MELD score unavailable. Necessary lab results were not found in the last year.    Plan:     Follow-up Provider:

## 2022-06-09 NOTE — TELEPHONE ENCOUNTER
Returned Mrs. Cobos's call and informed her that Dr. Dorantes has reached out to [facility] since [facility] has currently managing Mr. Cobos to have her send his information for IR conference. She voiced understanding.

## 2022-06-09 NOTE — TELEPHONE ENCOUNTER
----- Message from Sangita Tobar sent at 6/9/2022  9:08 AM CDT -----  Contact: Kathrine Chisholm called to consult with nurse or staff regarding the patient. She states she was waiting on a call from Micaela but has not heard from the office. She would like a call back and can be reached at [phone]. Thanks/MR

## 2022-06-13 ENCOUNTER — PATIENT MESSAGE (OUTPATIENT)
Dept: HEPATOLOGY | Facility: CLINIC | Age: 67
End: 2022-06-13
Payer: MEDICARE

## 2022-06-13 NOTE — TELEPHONE ENCOUNTER
Patient: Mike Cobos       MRN: [id number]      : 1955     Age: 66 y.o.  [address] [facility] [id number]     Providers  Hepatologists: Erinn Dorantes MD  Surgeons: NA  Radiologists: JOE  Advanced Practice providers: MURALI Chatterjee     Priority of review: Cancer     Patient Transplant Status: Other NA     Reason for presentation: Indeterminate lesion     Clinical Summary: He reports that he was recently seen for follow up and an AFP and US were completed.  Pt reprots that AFP is 2.7.  I do not have the report for review.   US showed a new lesion in the liver.  Showed a small 1cm hypoechoic nodule in the mid-liver.   A CT with and without contrast was then completed and showed a 6.1 X 6.2 cm enhancing heterogenous mass in the superior aspect of the right and left hepatic lobe.   Pt states that he has been feeling overall well without any complaints     Imaging to be reviewed: outside images recently uploaded   HCC Treatment History: NA     ABO:      Platelets:         Lab Results   Component Value Date/Time      (L) 2020 12:33 PM      Creatinine:         Lab Results   Component Value Date/Time     CREATININE 1.2 2020 12:33 PM      Bilirubin:         Lab Results   Component Value Date/Time     BILITOT 0.9 2020 12:33 PM      AFP Last 3 each if available:         Lab Results   Component Value Date/Time     AFP 3.6 2020 12:33 PM     AFP 3.8 2019 10:59 AM     AFP 3.4 2019 12:32 PM         MELD: Computed MELD-Na score unavailable. Necessary lab results were not found in the last year.  Computed MELD score unavailable. Necessary lab results were not found in the last year.     Plan: 7cm non enhancing lesion in the hepatic dome. Finding is indeterminate. MRI for further characterization or Biopsy       Big lesion in the dome that does not have arterial enhancement, it is hypo throughout the entirety of it,  It is indeterminate at this point.  Can get an MRI or  biopsy.  BX the lesion     Note sent to CESAR Conde for follow up     Follow-up Provider: Dr Dorantes

## 2022-06-14 ENCOUNTER — CONFERENCE (OUTPATIENT)
Dept: TRANSPLANT | Facility: CLINIC | Age: 67
End: 2022-06-14
Payer: MEDICARE

## 2022-06-14 ENCOUNTER — PATIENT MESSAGE (OUTPATIENT)
Dept: HEPATOLOGY | Facility: CLINIC | Age: 67
End: 2022-06-14
Payer: MEDICARE

## 2022-06-15 ENCOUNTER — TELEPHONE (OUTPATIENT)
Dept: HEPATOLOGY | Facility: CLINIC | Age: 67
End: 2022-06-15
Payer: MEDICARE

## 2022-06-15 NOTE — TELEPHONE ENCOUNTER
----- Message from Kane De La Garza sent at 6/15/2022  9:29 AM CDT -----  Contact: Kathrine Amador's wife Kathrine is asking for a return call in regards to a message she received on the my chart , please call back at .[phone]

## 2022-06-15 NOTE — TELEPHONE ENCOUNTER
Returned 's call and informed her that Dr. Dorantes did receive her message but is currently in clinic and will contact her once she's finished with patients. She voiced understanding.

## 2022-06-16 ENCOUNTER — TELEPHONE (OUTPATIENT)
Dept: HEPATOLOGY | Facility: CLINIC | Age: 67
End: 2022-06-16
Payer: MEDICARE

## 2022-06-16 DIAGNOSIS — R16.0 LIVER MASS: Primary | ICD-10-CM

## 2022-06-16 DIAGNOSIS — K74.69 OTHER CIRRHOSIS OF LIVER: ICD-10-CM

## 2022-06-16 NOTE — TELEPHONE ENCOUNTER
Spoke with patient and wife about IR conference recommendations. We will proceed with liver biopsy. Would like to get biopsy asap given size of his lesion. Will need labs the day of biopsy. Will ask nurse to call them with directions to [facility]. I told patient's wife that someone would call them tomorrow with an update.

## 2022-07-01 ENCOUNTER — TELEPHONE (OUTPATIENT)
Dept: RADIOLOGY | Facility: HOSPITAL | Age: 67
End: 2022-07-01
Payer: MEDICARE

## 2022-07-01 DIAGNOSIS — D68.9 COAGULATION DEFECT: ICD-10-CM

## 2022-07-01 DIAGNOSIS — K74.69 OTHER CIRRHOSIS OF LIVER: Primary | ICD-10-CM

## 2022-07-01 NOTE — TELEPHONE ENCOUNTER
Interventional Radiology:    Spoke with Kathrine, pts wife, confirming bx scheduled for Tuesday, July 5.  Verbalized understanding of NPO status after midnight, arrival time at 7:30am, and stated that pt has not been taking any blood thinners or aspirin for a few weeks now.  Discussed diabetic medications and suggested that he not take them the morning of procedure due to pt not being able to eat or drink anything and Mrs. Chisholm saying his Blood Sugar is usually around 120 in the mornings.

## 2022-07-05 ENCOUNTER — HOSPITAL ENCOUNTER (OUTPATIENT)
Dept: RADIOLOGY | Facility: HOSPITAL | Age: 67
Discharge: HOME | End: 2022-07-05
Attending: INTERNAL MEDICINE
Payer: MEDICARE

## 2022-07-05 VITALS
HEIGHT: 73 IN | SYSTOLIC BLOOD PRESSURE: 111 MMHG | RESPIRATION RATE: 16 BRPM | OXYGEN SATURATION: 95 % | WEIGHT: 205 LBS | BODY MASS INDEX: 27.17 KG/M2 | DIASTOLIC BLOOD PRESSURE: 61 MMHG | HEART RATE: 57 BPM

## 2022-07-05 DIAGNOSIS — K74.69 OTHER CIRRHOSIS OF LIVER: ICD-10-CM

## 2022-07-05 DIAGNOSIS — R16.0 LIVER MASS: ICD-10-CM

## 2022-07-05 PROCEDURE — 77012 CT SCAN FOR NEEDLE BIOPSY: CPT | Mod: TC

## 2022-07-05 PROCEDURE — 63600175 PHARM REV CODE 636 W HCPCS: Performed by: RADIOLOGY

## 2022-07-05 RX ORDER — MIDAZOLAM HYDROCHLORIDE 1 MG/ML
INJECTION INTRAMUSCULAR; INTRAVENOUS CODE/TRAUMA/SEDATION MEDICATION
Status: COMPLETED | OUTPATIENT
Start: 2022-07-05 | End: 2022-07-05

## 2022-07-05 RX ORDER — FENTANYL CITRATE 50 UG/ML
INJECTION, SOLUTION INTRAMUSCULAR; INTRAVENOUS CODE/TRAUMA/SEDATION MEDICATION
Status: COMPLETED | OUTPATIENT
Start: 2022-07-05 | End: 2022-07-05

## 2022-07-05 RX ADMIN — MIDAZOLAM HYDROCHLORIDE 1 MG: 1 INJECTION INTRAMUSCULAR; INTRAVENOUS at 09:07

## 2022-07-05 RX ADMIN — FENTANYL CITRATE 50 MCG: 50 INJECTION, SOLUTION INTRAMUSCULAR; INTRAVENOUS at 09:07

## 2022-07-05 NOTE — H&P
[facility] ([facility])  History & Physical    Subjective:      Chief Complaint/Reason for Admission: liver mass    Mike Cobos is a 66 y.o. male with history of hepatic cirrhosis and liver mass.  He presents today for liver mass biopsy.  He denies abdominal pain, CP, SOB, N/V, fever.  He has no complaints today.  Here for liver mass biopsy.    Past Medical History:   Diagnosis Date    Alcohol abuse, in remission     Cirrhosis     Depression     DM (diabetes mellitus)     HTN (hypertension)     Hypercholesterolemia      Past Surgical History:   Procedure Laterality Date    CORNEAL TRANSPLANT      SINUS SURGERY       No family history on file.  Social History     Tobacco Use    Smoking status: Never Smoker    Smokeless tobacco: Former User     Types: Snuff     Quit date: 1/9/1998   Substance Use Topics    Alcohol use: No     Comment: recovering alcholic    Drug use: No       (Not in a hospital admission)    Review of patient's allergies indicates:  No Known Allergies     Review of Systems   Constitutional: Negative for chills and fever.   HENT: Negative for sore throat.    Eyes: Negative for blurred vision.   Respiratory: Negative for cough and shortness of breath.    Cardiovascular: Negative for chest pain and leg swelling.   Gastrointestinal: Negative for abdominal pain, nausea and vomiting.   Genitourinary: Negative for dysuria.   Musculoskeletal: Negative for myalgias.   Skin: Negative for rash.   Neurological: Negative for dizziness.       Objective:      Vital Signs (Most Recent)  Pulse: 64 (07/05/22 0758)  Resp: 18 (07/05/22 0758)  BP: 139/65 (07/05/22 0758)  SpO2: 95 % (07/05/22 0758)    Vital Signs Range (Last 24H):  Pulse:  [64]   Resp:  [18]   BP: (139)/(65)   SpO2:  [95 %]     Physical Exam  Constitutional:       General: He is not in acute distress.     Appearance: Normal appearance.   HENT:      Head: Normocephalic and atraumatic.   Eyes:      Extraocular Movements:  Extraocular movements intact.      Pupils: Pupils are equal, round, and reactive to light.   Cardiovascular:      Rate and Rhythm: Normal rate and regular rhythm.      Pulses: Normal pulses.   Pulmonary:      Effort: No respiratory distress.      Breath sounds: No wheezing, rhonchi or rales.   Abdominal:      General: Abdomen is flat. Bowel sounds are normal. There is no distension.      Palpations: Abdomen is soft.   Musculoskeletal:         General: No swelling or deformity.      Cervical back: Normal range of motion.   Skin:     General: Skin is warm and dry.      Capillary Refill: Capillary refill takes less than 2 seconds.      Coloration: Skin is not jaundiced.   Neurological:      General: No focal deficit present.      Mental Status: He is alert and oriented to person, place, and time.   Psychiatric:         Mood and Affect: Mood normal.         Thought Content: Thought content normal.         Judgment: Judgment normal.         Data Review:    CBC:   Lab Results   Component Value Date    WBC 4.27 07/05/2022    RBC 4.80 07/05/2022    HGB 13.1 (L) 07/05/2022    HCT 41.4 07/05/2022     (L) 07/05/2022     BMP:   Lab Results   Component Value Date     (H) 01/29/2020     01/29/2020    K 4.4 01/29/2020     01/29/2020    CO2 26 01/29/2020    BUN 15 01/29/2020    CREATININE 1.2 01/29/2020    CALCIUM 10.2 01/29/2020     Coagulation:   Lab Results   Component Value Date    INR 1.0 07/05/2022           Assessment:      Liver mass     Plan:    Image guided liver mass biopsy

## 2022-07-05 NOTE — PLAN OF CARE
Pt ambulated from waiting room to pre-procedure area independently. Pt is Aox4. Pt denies pain and is resting comfortably. VS taken and stable. Will continue to monitor.

## 2022-07-05 NOTE — DISCHARGE SUMMARY
Pre Op Diagnosis: liver mass     Post Op Diagnosis: same     Procedure:  Image guided liver mass biopsy     Procedure performed by: Eric MARTINEZ, Brett MACIAS     Written Informed Consent Obtained: Yes     Specimen Removed:  yes     Estimated Blood Loss:  minimal     Findings: Local anesthesia     The patient tolerated the procedure well and there were no complications.      Sterile technique was performed in the RUQ, lidocaine was used as a local anesthetic.  Multiple samples taken from liver mass.  Patient tolerated the procedure well without immediate complications.  Please see radiologist report for details. F/u with PCP and/or ordering physician.

## 2022-07-05 NOTE — PLAN OF CARE
Verbal and written discharge instructions given to patient including when to seek medical attention and site care. Pt verbalized understanding. PIV safely discontinued. Dressing to procedure site remains CDI. Pt denies pain and NADN. VSS. Pt transported to main entrance via w/c with all belongings, where met by family in personal vehicle. Pt was stable on discharge.

## 2022-07-05 NOTE — PLAN OF CARE
Patient received from procedure area. Verbalized some discomfort d/t positioning on table. 2 Bandaids noted to right side C/D/I . Report received from YARELIS Carvajal. Will continue to monitor.

## 2022-07-05 NOTE — NURSING
Notified Alanis SANTOYO that pt took last dose of fish oil on 7/3/22 PM dose. Per Alanis, she notified Dr. Reyes who ok'd to proceed with procedure.

## 2022-07-07 ENCOUNTER — TELEPHONE (OUTPATIENT)
Dept: HEPATOLOGY | Facility: CLINIC | Age: 67
End: 2022-07-07
Payer: MEDICARE

## 2022-07-07 NOTE — TELEPHONE ENCOUNTER
----- Message from Nery Nolen sent at 7/7/2022  8:38 AM CDT -----  Contact: Pts Mobile    [phone]               Pts wife Mrs. Cobos  Patients wife Mrs. Cobos would like for you to give the patient a call in regards to his virtual visit that is scheduled with you on today for eight thirty please.

## 2022-07-07 NOTE — TELEPHONE ENCOUNTER
Returned patient's wife's phone call and instructed her on how to properly log in to a virtual visit. I've rescheduled the appointment for Tuesday 7/12/22 at 8:00.

## 2022-07-12 ENCOUNTER — OFFICE VISIT (OUTPATIENT)
Dept: GASTROENTEROLOGY | Facility: CLINIC | Age: 67
End: 2022-07-12
Payer: MEDICARE

## 2022-07-12 DIAGNOSIS — R11.0 NAUSEA: ICD-10-CM

## 2022-07-12 DIAGNOSIS — R16.0 LIVER MASS: Primary | ICD-10-CM

## 2022-07-12 DIAGNOSIS — K74.69 OTHER CIRRHOSIS OF LIVER: ICD-10-CM

## 2022-07-12 PROCEDURE — 99214 PR OFFICE/OUTPT VISIT, EST, LEVL IV, 30-39 MIN: ICD-10-PCS | Mod: 95,,, | Performed by: NURSE PRACTITIONER

## 2022-07-12 PROCEDURE — 99214 OFFICE O/P EST MOD 30 MIN: CPT | Mod: 95,,, | Performed by: NURSE PRACTITIONER

## 2022-07-12 RX ORDER — ONDANSETRON HYDROCHLORIDE 8 MG/1
8 TABLET, FILM COATED ORAL EVERY 8 HOURS PRN
Qty: 30 TABLET | Refills: 1 | Status: SHIPPED | OUTPATIENT
Start: 2022-07-12

## 2022-07-12 NOTE — PROGRESS NOTES
Clinic Follow Up:  [facility] Follow Up Note    Reason for Follow Up:  The primary encounter diagnosis was Liver mass. Diagnoses of Nausea and Other cirrhosis of liver were also pertinent to this visit.    PCP: Primary Doctor No     The patient location is: [place]  The chief complaint leading to consultation is: above    Visit type: audiovisual    Face to Face time with patient: 20 minutes  25 minutes of total time spent on the encounter, which includes face to face time and non-face to face time preparing to see the patient (eg, review of tests), Obtaining and/or reviewing separately obtained history, Documenting clinical information in the electronic or other health record, Independently interpreting results (not separately reported) and communicating results to the patient/family/caregiver, or Care coordination (not separately reported).         Each patient to whom he or she provides medical services by telemedicine is:  (1) informed of the relationship between the physician and patient and the respective role of any other health care provider with respect to management of the patient; and (2) notified that he or she may decline to receive medical services by telemedicine and may withdraw from such care at any time.    Notes:       HPI:  This is a 66 y.o. male here for follow up of the above  Pt is accompanied by his wife throughout the entire visit  He states that he has been feeling overall stable with the exception of intermittent nausea.  He denies any abdominal pain.   No vomiting.   No pain at the biopsy site  Pathology still pending.   No upper GI bleeding.  No ascites or BLE.  No overt confusion.      Review of Systems   Constitutional: Negative for chills, fever, malaise/fatigue and weight loss.   Respiratory: Negative for cough.    Cardiovascular: Negative for chest pain.   Gastrointestinal:        Per HPI   Musculoskeletal: Negative for myalgias.   Skin: Negative for itching  and rash.   Neurological: Negative for headaches.   Psychiatric/Behavioral: The patient is not nervous/anxious.        Medical History:  Past Medical History:   Diagnosis Date    Alcohol abuse, in remission     Cirrhosis     Depression     DM (diabetes mellitus)     HTN (hypertension)     Hypercholesterolemia        Surgical History:   Past Surgical History:   Procedure Laterality Date    CORNEAL TRANSPLANT      SINUS SURGERY         Family History:   No family history on file.    Social History:   Social History     Tobacco Use    Smoking status: Never Smoker    Smokeless tobacco: Former User     Types: Snuff     Quit date: 1/9/1998   Substance Use Topics    Alcohol use: No     Comment: recovering alcholic    Drug use: No       Allergies: Reviewed    Home Medications:  Current Outpatient Medications on File Prior to Visit   Medication Sig Dispense Refill    aspirin (ECOTRIN) 81 MG EC tablet Take 81 mg by mouth once daily.      azelastine-fluticasone (DYMISTA) 137-50 mcg/spray Spry nassal spray 1 spray by Each Nare route 2 (two) times daily.      clonazePAM (KLONOPIN) 0.25 MG TbDL 1 tablet on the tongue and allow to dissolve      FARXIGA 10 mg tablet Take 10 mg by mouth once daily.      fish oil-omega-3 fatty acids 300-1,000 mg capsule Take by mouth once daily.      Lactobacillus rhamnosus GG (CULTURELLE) 10 billion cell capsule Take 1 capsule by mouth once daily.      loratadine (CLARITIN) 10 mg tablet Take 10 mg by mouth once daily.      losartan (COZAAR) 50 MG tablet Take 50 mg by mouth once daily.      montelukast (SINGULAIR) 10 mg tablet Take 10 mg by mouth once daily.      multivitamin capsule Take 1 capsule by mouth once daily.      ondansetron (ZOFRAN-ODT) 8 MG TbDL Take 1 tablet (8 mg total) by mouth daily as needed. 12 tablet 2    RYBELSUS 3 mg tablet Take 3 mg by mouth once daily.      sertraline (ZOLOFT) 100 MG tablet Take 100 mg by mouth 2 (two) times daily.       simvastatin  (ZOCOR) 40 MG tablet Take 40 mg by mouth every evening.      SITagliptan-metformin (JANUMET) 50-1,000 mg per tablet Take 1 tablet by mouth once daily.       trazodone HCl (TRAZODONE ORAL) Take 75 mg by mouth once daily.      turm/ging/magy/yuc/jon/gail/hor (TUMERSAID ORAL) Take by mouth.      vitamin D (VITAMIN D3) 1000 units Tab Take 2,000 Units by mouth once daily.       No current facility-administered medications on file prior to visit.       Physical Exam:  Vital Signs:  There were no vitals taken for this visit.  There is no height or weight on file to calculate BMI.  Physical Exam  Constitutional:       Appearance: He is well-developed.   HENT:      Head: Normocephalic.   Eyes:      General: No scleral icterus.  Pulmonary:      Effort: Pulmonary effort is normal.   Musculoskeletal:         General: Normal range of motion.      Cervical back: Normal range of motion.   Skin:     General: Skin is dry.   Neurological:      Mental Status: He is alert.         Labs: Pertinent labs reviewed.      Assessment:  1. Liver mass    2. Nausea    3. Other cirrhosis of liver        Recommendations:  Long discussion with pt and wife on the next steps  - pathology remains pending  - once pathology is available, may need to re-present to IR conference for treatment options.   - Discussed different treatment options.   - will send prescription for zofran for intermittent nausea  - will update pt and family once pathology is available for review.       Return to Clinic:    Follow up to be determined by results of above.

## 2022-07-13 LAB
COMMENT: ABNORMAL
FINAL PATHOLOGIC DIAGNOSIS: ABNORMAL
GROSS: ABNORMAL
Lab: ABNORMAL

## 2022-07-14 ENCOUNTER — OFFICE VISIT (OUTPATIENT)
Dept: HEPATOLOGY | Facility: CLINIC | Age: 67
End: 2022-07-14
Payer: MEDICARE

## 2022-07-14 ENCOUNTER — PATIENT MESSAGE (OUTPATIENT)
Dept: HEPATOLOGY | Facility: CLINIC | Age: 67
End: 2022-07-14

## 2022-07-14 DIAGNOSIS — C80.1 CARCINOMA: ICD-10-CM

## 2022-07-14 DIAGNOSIS — C78.7 SECONDARY MALIGNANT NEOPLASM OF LIVER AND INTRAHEPATIC BILE DUCT: ICD-10-CM

## 2022-07-14 DIAGNOSIS — R16.0 LIVER MASS: Primary | ICD-10-CM

## 2022-07-14 DIAGNOSIS — R79.89 OTHER SPECIFIED ABNORMAL FINDINGS OF BLOOD CHEMISTRY: ICD-10-CM

## 2022-07-14 PROCEDURE — 99214 PR OFFICE/OUTPT VISIT, EST, LEVL IV, 30-39 MIN: ICD-10-PCS | Mod: 95,,, | Performed by: NURSE PRACTITIONER

## 2022-07-14 PROCEDURE — 99214 OFFICE O/P EST MOD 30 MIN: CPT | Mod: 95,,, | Performed by: NURSE PRACTITIONER

## 2022-07-14 NOTE — PROGRESS NOTES
Clinic Follow Up:  [facility] Follow Up Note    Reason for Follow Up:  The primary encounter diagnosis was Liver mass. Diagnoses of Carcinoma, Other specified abnormal findings of blood chemistry , and Secondary malignant neoplasm of liver and intrahepatic bile duct  were also pertinent to this visit.    PCP: Primary Doctor No     The patient location is: [place]  The chief complaint leading to consultation is: above    Visit type: audiovisual    Face to Face time with patient: 25 minutes  45 minutes of total time spent on the encounter, which includes face to face time and non-face to face time preparing to see the patient (eg, review of tests), Obtaining and/or reviewing separately obtained history, Documenting clinical information in the electronic or other health record, Independently interpreting results (not separately reported) and communicating results to the patient/family/caregiver, or Care coordination (not separately reported).         Each patient to whom he or she provides medical services by telemedicine is:  (1) informed of the relationship between the physician and patient and the respective role of any other health care provider with respect to management of the patient; and (2) notified that he or she may decline to receive medical services by telemedicine and may withdraw from such care at any time.    Notes:       HPI:  This is a 66 y.o. male here for follow up of the above  Pt states that he is feeling overall stable  Pathology results from recent biopsy available for review  There is concern for possible metastasis from cholangiocarcinoma vs pancreatic vs pulmonary vs GI  See pathology results for details  Case discussed with Dr. Dorantes, pt and family present throughout the visit.   No upper GI bleeding.  No ascites or BLE.  No overt confusion.      Review of Systems   Constitutional: Negative for chills, fever, malaise/fatigue and weight loss.   Respiratory: Negative for  cough.    Cardiovascular: Negative for chest pain.   Gastrointestinal:        Per HPI   Musculoskeletal: Negative for myalgias.   Skin: Negative for itching and rash.   Neurological: Negative for headaches.   Psychiatric/Behavioral: The patient is not nervous/anxious.        Medical History:  Past Medical History:   Diagnosis Date    Alcohol abuse, in remission     Cirrhosis     Depression     DM (diabetes mellitus)     HTN (hypertension)     Hypercholesterolemia        Surgical History:   Past Surgical History:   Procedure Laterality Date    CORNEAL TRANSPLANT      SINUS SURGERY         Family History:   No family history on file.    Social History:   Social History     Tobacco Use    Smoking status: Never Smoker    Smokeless tobacco: Former User     Types: Snuff     Quit date: 1/9/1998   Substance Use Topics    Alcohol use: No     Comment: recovering alcholic    Drug use: No       Allergies: Reviewed    Home Medications:  Current Outpatient Medications on File Prior to Visit   Medication Sig Dispense Refill    azelastine-fluticasone (DYMISTA) 137-50 mcg/spray Spry nassal spray 1 spray by Each Nare route 2 (two) times daily.      clonazePAM (KLONOPIN) 0.25 MG TbDL 1 tablet on the tongue and allow to dissolve      FARXIGA 10 mg tablet Take 10 mg by mouth once daily.      fish oil-omega-3 fatty acids 300-1,000 mg capsule Take by mouth once daily.      Lactobacillus rhamnosus GG (CULTURELLE) 10 billion cell capsule Take 1 capsule by mouth once daily.      loratadine (CLARITIN) 10 mg tablet Take 10 mg by mouth once daily.      losartan (COZAAR) 50 MG tablet Take 50 mg by mouth once daily.      montelukast (SINGULAIR) 10 mg tablet Take 10 mg by mouth once daily.      multivitamin capsule Take 1 capsule by mouth once daily.      ondansetron (ZOFRAN) 8 MG tablet Take 1 tablet (8 mg total) by mouth every 8 (eight) hours as needed for Nausea. 30 tablet 1    ondansetron (ZOFRAN-ODT) 8 MG TbDL Take 1  tablet (8 mg total) by mouth daily as needed. 12 tablet 2    RYBELSUS 3 mg tablet Take 3 mg by mouth once daily.      sertraline (ZOLOFT) 100 MG tablet Take 100 mg by mouth 2 (two) times daily.       simvastatin (ZOCOR) 40 MG tablet Take 40 mg by mouth every evening.      SITagliptan-metformin (JANUMET) 50-1,000 mg per tablet Take 1 tablet by mouth once daily.       trazodone HCl (TRAZODONE ORAL) Take 75 mg by mouth once daily.      turm/ging/magy/yuc/jon/gail/hor (TUMERSAID ORAL) Take by mouth.      vitamin D (VITAMIN D3) 1000 units Tab Take 2,000 Units by mouth once daily.       No current facility-administered medications on file prior to visit.       Physical Exam:  Vital Signs:  There were no vitals taken for this visit.  There is no height or weight on file to calculate BMI.  Physical Exam  Constitutional:       Appearance: He is well-developed.   HENT:      Head: Normocephalic.   Eyes:      General: No scleral icterus.  Pulmonary:      Effort: Pulmonary effort is normal.   Musculoskeletal:         General: Normal range of motion.      Cervical back: Normal range of motion.   Skin:     General: Skin is dry.   Neurological:      Mental Status: He is alert.         Labs: Pertinent labs reviewed.      Assessment:  1. Liver mass    2. Carcinoma    3. Other specified abnormal findings of blood chemistry     4. Secondary malignant neoplasm of liver and intrahepatic bile duct         Recommendations:  Long discussion with pt and family on the probable diagnosis of carcinoma given the biopsy report  - discussed with Dr. Dorantes  -will have pt case reviewed again at IR conference and at pathology conference  - will have pt get additional labs, including CEA and  with chest CT.  - Will update pt once case conference notes available for discussion.   - pt will also likely need a referral to oncology.  Will determine after conference completed.     Return to Clinic:  After above completed

## 2022-07-15 ENCOUNTER — TELEPHONE (OUTPATIENT)
Dept: TRANSPLANT | Facility: CLINIC | Age: 67
End: 2022-07-15
Payer: MEDICARE

## 2022-07-15 ENCOUNTER — TELEPHONE (OUTPATIENT)
Dept: HEPATOLOGY | Facility: CLINIC | Age: 67
End: 2022-07-15
Payer: MEDICARE

## 2022-07-15 PROBLEM — C80.1 CARCINOMA: Status: ACTIVE | Noted: 2022-07-15

## 2022-07-15 PROBLEM — R16.0 LIVER MASS: Status: ACTIVE | Noted: 2022-07-15

## 2022-07-15 NOTE — TELEPHONE ENCOUNTER
Patient: Mike Cobos       MRN: [id number]      : 1955     Age: 66 y.o.  [address] [id number]    Providers  Hepatologists: Erinn Dorantes MD  Surgeons: JOE  Radiologists: JOE  Advanced Practice providers:MURALI Chatterjee    Priority of review: Cancer    Patient Transplant Status: Other NA    Reason for presentation: Reassessment    Clinical Summary: Pt previously presented with unclear etiology of liver mass.  Biopsy completed.  See path report.   Pt to have CT chest with additional labs     Imaging to be reviewed: see previous imaging    HCC Treatment History: NA    ABO:     Platelets:   Lab Results   Component Value Date/Time     (L) 2022 07:39 AM     Creatinine:   Lab Results   Component Value Date/Time    CREATININE 1.1 2022 07:39 AM     Bilirubin:   Lab Results   Component Value Date/Time    BILITOT 0.6 2022 07:39 AM     AFP Last 3 each if available:   Lab Results   Component Value Date/Time    AFP 3.6 2020 12:33 PM    AFP 3.8 2019 10:59 AM    AFP 3.4 2019 12:32 PM       MELD: MELD-Na score: 7 at 2022  7:39 AM  MELD score: 7 at 2022  7:39 AM  Calculated from:  Serum Creatinine: 1.1 mg/dL at 2022  7:39 AM  Serum Sodium: 139 mmol/L (Using max of 137 mmol/L) at 2022  7:39 AM  Total Bilirubin: 0.6 mg/dL (Using min of 1 mg/dL) at 2022  7:39 AM  INR(ratio): 1.0 at 2022  7:39 AM  Age: 66 years    Plan:     Follow-up Provider:

## 2022-07-15 NOTE — TELEPHONE ENCOUNTER
Called patient and informed him of scheduled CT scan. Informed patient to please arrive to [facility] for roughly 1:30pm on 07/18 so that he can complete his labs as well as complete prep for CT Scan. Also informed patient to please fast 4 hours prior to scan. He verbalized understanding and accepted appointment.

## 2022-07-15 NOTE — TELEPHONE ENCOUNTER
IR Liver Pathology Conference Note    Patient:  Mike Cobos  MRN:   [id number]  YOB: 1955  Date of Transplant:  N/A  Native Diagnosis:     Discussion/Plan:    Presenter: Hepatologist - Erinn Dorantes MD    Reason for presenting: liver mass    Concerns for Pathologists:   Biopsy unable to R/O primary liver vs mets   Pt is scheduled for additional labs and imaging.   AFP WNL    Lab Results  WBC (K/uL)   Date Value   07/05/2022 4.27   01/29/2020 4.05   07/24/2019 5.10     PLT (K/uL)   Date Value   07/05/2022 119 (L)   01/29/2020 122 (L)   07/24/2019 133 (L)     INR (no units)   Date Value   07/05/2022 1.0   01/29/2020 1.0   07/24/2019 1.0     AST (U/L)   Date Value   07/05/2022 25     ALT (U/L)   Date Value   07/05/2022 26   01/29/2020 27   07/24/2019 29     BILITOT (mg/dL)   Date Value   07/05/2022 0.6   01/29/2020 0.9   07/24/2019 1.4 (H)     ALKPHOS (U/L)   Date Value   07/05/2022 108   01/29/2020 58   07/24/2019 61     CREATININE (mg/dL)   Date Value   07/05/2022 1.1   01/29/2020 1.2   07/24/2019 1.1     AFP (ng/mL)   Date Value   01/29/2020 3.6   07/24/2019 3.8   01/09/2019 3.4

## 2022-07-18 ENCOUNTER — HOSPITAL ENCOUNTER (OUTPATIENT)
Dept: RADIOLOGY | Facility: HOSPITAL | Age: 67
Discharge: HOME | End: 2022-07-18
Attending: NURSE PRACTITIONER
Payer: MEDICARE

## 2022-07-18 DIAGNOSIS — R16.0 LIVER MASS: ICD-10-CM

## 2022-07-18 DIAGNOSIS — C80.1 CARCINOMA: ICD-10-CM

## 2022-07-18 PROCEDURE — 71260 CT THORAX DX C+: CPT | Mod: TC

## 2022-07-18 PROCEDURE — 25500020 PHARM REV CODE 255: Performed by: NURSE PRACTITIONER

## 2022-07-18 RX ADMIN — IOPAMIDOL 100 ML: 755 INJECTION, SOLUTION INTRAVENOUS at 03:07

## 2022-07-20 ENCOUNTER — PATIENT MESSAGE (OUTPATIENT)
Dept: HEPATOLOGY | Facility: CLINIC | Age: 67
End: 2022-07-20
Payer: MEDICARE

## 2022-07-21 ENCOUNTER — CONFERENCE (OUTPATIENT)
Dept: TRANSPLANT | Facility: CLINIC | Age: 67
End: 2022-07-21
Payer: MEDICARE

## 2022-07-22 ENCOUNTER — CONFERENCE (OUTPATIENT)
Dept: TRANSPLANT | Facility: CLINIC | Age: 67
End: 2022-07-22
Payer: MEDICARE

## 2022-07-22 NOTE — TELEPHONE ENCOUNTER
7/21/22: Liver Pathology Conference:    Liver Lesion Biopsy: + Malignancy/ poorly differentiated ?cholangio, with sarcomatoid features/ doesn't look like HCC

## 2022-07-26 ENCOUNTER — TELEPHONE (OUTPATIENT)
Dept: HEMATOLOGY/ONCOLOGY | Facility: CLINIC | Age: 67
End: 2022-07-26
Payer: MEDICARE

## 2022-07-26 ENCOUNTER — OFFICE VISIT (OUTPATIENT)
Dept: HEPATOLOGY | Facility: CLINIC | Age: 67
End: 2022-07-26
Payer: MEDICARE

## 2022-07-26 DIAGNOSIS — C22.1 CHOLANGIOCARCINOMA: Primary | ICD-10-CM

## 2022-07-26 DIAGNOSIS — R16.0 LIVER MASS: ICD-10-CM

## 2022-07-26 DIAGNOSIS — R97.8 ELEVATED TUMOR MARKERS: ICD-10-CM

## 2022-07-26 PROCEDURE — 99214 OFFICE O/P EST MOD 30 MIN: CPT | Mod: 95,,, | Performed by: NURSE PRACTITIONER

## 2022-07-26 PROCEDURE — 99214 PR OFFICE/OUTPT VISIT, EST, LEVL IV, 30-39 MIN: ICD-10-PCS | Mod: 95,,, | Performed by: NURSE PRACTITIONER

## 2022-07-26 NOTE — TELEPHONE ENCOUNTER
Received referral in Oncology Work queue for liver mass/ cholangiocarcinoma . Kylie Barnes also ordered PET scan prior to ONcology appt.   I have spoken to pt wife over the phone today and they travel from [facility] And request appts on same day.     Pt will have PET scan 8/4/22 at 9 am Pet prep reviewed with pt wife and she stated understanding. Pt will then see Dr. Bryant at 1120 at the cancer center same day.   They are good with appt plan and will attend  Travel directions and address reviewed with pt wife and she stated understanding and will attend appt

## 2022-07-26 NOTE — PROGRESS NOTES
Clinic Follow Up:  [facility] Follow Up Note    Reason for Follow Up:  The primary encounter diagnosis was Cholangiocarcinoma. Diagnoses of Liver mass and Elevated tumor markers were also pertinent to this visit.    PCP: Kylie Conde     The patient location is: [place]  The chief complaint leading to consultation is: above    Visit type: audiovisual    Face to Face time with patient: 30 minutes  45 minutes of total time spent on the encounter, which includes face to face time and non-face to face time preparing to see the patient (eg, review of tests), Obtaining and/or reviewing separately obtained history, Documenting clinical information in the electronic or other health record, Independently interpreting results (not separately reported) and communicating results to the patient/family/caregiver, or Care coordination (not separately reported).         Each patient to whom he or she provides medical services by telemedicine is:  (1) informed of the relationship between the physician and patient and the respective role of any other health care provider with respect to management of the patient; and (2) notified that he or she may decline to receive medical services by telemedicine and may withdraw from such care at any time.    Notes:     HPI:  This is a 67 y.o. male here for follow up of the above  Pt states that he has been feeling overall stable.  No issues with nausea, abdominal pain, jaundice or fatigue  Per IR and path conference, liver mass and biopsy is consistent with cholangiocarcinoma.    also elevated  Pt reports last EGD/colonospcy in 2020.   No previous PET scans        Review of Systems   Constitutional: Negative for chills, fever, malaise/fatigue and weight loss.   Respiratory: Negative for cough.    Cardiovascular: Negative for chest pain.   Gastrointestinal:        Per HPI   Musculoskeletal: Negative for myalgias.   Skin: Negative for itching and rash.   Neurological:  Negative for headaches.   Psychiatric/Behavioral: The patient is not nervous/anxious.        Medical History:  Past Medical History:   Diagnosis Date    Alcohol abuse, in remission     Cirrhosis     Depression     DM (diabetes mellitus)     HTN (hypertension)     Hypercholesterolemia        Surgical History:   Past Surgical History:   Procedure Laterality Date    CORNEAL TRANSPLANT      SINUS SURGERY         Family History:   No family history on file.    Social History:   Social History     Tobacco Use    Smoking status: Never Smoker    Smokeless tobacco: Former User     Types: Snuff     Quit date: 1/9/1998   Substance Use Topics    Alcohol use: No     Comment: recovering alcholic    Drug use: No       Allergies: Reviewed    Home Medications:  Current Outpatient Medications on File Prior to Visit   Medication Sig Dispense Refill    azelastine-fluticasone (DYMISTA) 137-50 mcg/spray Spry nassal spray 1 spray by Each Nare route 2 (two) times daily.      clonazePAM (KLONOPIN) 0.25 MG TbDL 1 tablet on the tongue and allow to dissolve      FARXIGA 10 mg tablet Take 10 mg by mouth once daily.      fish oil-omega-3 fatty acids 300-1,000 mg capsule Take by mouth once daily.      Lactobacillus rhamnosus GG (CULTURELLE) 10 billion cell capsule Take 1 capsule by mouth once daily.      loratadine (CLARITIN) 10 mg tablet Take 10 mg by mouth once daily.      losartan (COZAAR) 50 MG tablet Take 50 mg by mouth once daily.      montelukast (SINGULAIR) 10 mg tablet Take 10 mg by mouth once daily.      multivitamin capsule Take 1 capsule by mouth once daily.      ondansetron (ZOFRAN) 8 MG tablet Take 1 tablet (8 mg total) by mouth every 8 (eight) hours as needed for Nausea. 30 tablet 1    ondansetron (ZOFRAN-ODT) 8 MG TbDL Take 1 tablet (8 mg total) by mouth daily as needed. 12 tablet 2    RYBELSUS 3 mg tablet Take 3 mg by mouth once daily.      sertraline (ZOLOFT) 100 MG tablet Take 100 mg by mouth 2 (two)  times daily.       simvastatin (ZOCOR) 40 MG tablet Take 40 mg by mouth every evening.      SITagliptan-metformin (JANUMET) 50-1,000 mg per tablet Take 1 tablet by mouth once daily.       trazodone HCl (TRAZODONE ORAL) Take 75 mg by mouth once daily.      turm/ging/magy/yuc/jon/gail/hor (TUMERSAID ORAL) Take by mouth.      vitamin D (VITAMIN D3) 1000 units Tab Take 2,000 Units by mouth once daily.       No current facility-administered medications on file prior to visit.       Physical Exam:  Vital Signs:  There were no vitals taken for this visit.  There is no height or weight on file to calculate BMI.  Physical Exam  Constitutional:       Appearance: He is well-developed.   HENT:      Head: Normocephalic.   Eyes:      General: No scleral icterus.  Pulmonary:      Effort: Pulmonary effort is normal.   Musculoskeletal:         General: Normal range of motion.      Cervical back: Normal range of motion.   Skin:     General: Skin is dry.   Neurological:      Mental Status: He is alert.         Labs: Pertinent labs reviewed.  MELD-Na score: 8 at 7/18/2022 12:31 PM  MELD score: 8 at 7/18/2022 12:31 PM  Calculated from:  Serum Creatinine: 1.19 mg/dL at 7/18/2022 12:31 PM  Serum Sodium: 139 mmol/L (Using max of 137 mmol/L) at 7/18/2022 12:31 PM  Total Bilirubin: 0.8 mg/dL (Using min of 1 mg/dL) at 7/18/2022 12:31 PM  INR(ratio): 1.03 at 7/18/2022 12:31 PM  Age: 66 years        Assessment:  1. Cholangiocarcinoma    2. Liver mass    3. Elevated tumor markers        Recommendations:  Long discussion with pt and his wife on the diagnosis and POC going forward with this diagnosis  - will have pt contact his local GI provider to set up EGD and colonoscopy.   - pt would like to be seen in [place] for oncology.  Referral placed  - PET scan ordered.  Will try to get prior to his visit with oncology so it is available for review.       Return to Clinic:  As needed based on symptoms  Will also need regular cirrhosis management every  6 months.

## 2022-07-28 ENCOUNTER — PATIENT MESSAGE (OUTPATIENT)
Dept: HEPATOLOGY | Facility: CLINIC | Age: 67
End: 2022-07-28
Payer: MEDICARE

## 2022-08-04 ENCOUNTER — OFFICE VISIT (OUTPATIENT)
Dept: HEMATOLOGY/ONCOLOGY | Facility: CLINIC | Age: 67
End: 2022-08-04
Payer: MEDICARE

## 2022-08-04 ENCOUNTER — TELEPHONE (OUTPATIENT)
Dept: HEMATOLOGY/ONCOLOGY | Facility: CLINIC | Age: 67
End: 2022-08-04
Payer: MEDICARE

## 2022-08-04 ENCOUNTER — HOSPITAL ENCOUNTER (OUTPATIENT)
Dept: RADIOLOGY | Facility: HOSPITAL | Age: 67
Discharge: HOME | End: 2022-08-04
Attending: NURSE PRACTITIONER
Payer: MEDICARE

## 2022-08-04 VITALS
DIASTOLIC BLOOD PRESSURE: 59 MMHG | TEMPERATURE: 98 F | BODY MASS INDEX: 26.38 KG/M2 | HEART RATE: 67 BPM | WEIGHT: 199.06 LBS | HEIGHT: 73 IN | SYSTOLIC BLOOD PRESSURE: 105 MMHG

## 2022-08-04 DIAGNOSIS — C78.1 SECONDARY ADENOCARCINOMA OF MEDIASTINUM: ICD-10-CM

## 2022-08-04 DIAGNOSIS — E78.2 MIXED HYPERLIPIDEMIA: ICD-10-CM

## 2022-08-04 DIAGNOSIS — Z79.899 IMMUNODEFICIENCY DUE TO CHEMOTHERAPY: ICD-10-CM

## 2022-08-04 DIAGNOSIS — R16.0 LIVER MASS: ICD-10-CM

## 2022-08-04 DIAGNOSIS — K70.30 ALCOHOLIC CIRRHOSIS OF LIVER WITHOUT ASCITES: ICD-10-CM

## 2022-08-04 DIAGNOSIS — R97.8 ELEVATED TUMOR MARKERS: ICD-10-CM

## 2022-08-04 DIAGNOSIS — C79.51 SECONDARY MALIGNANT NEOPLASM OF BONE: ICD-10-CM

## 2022-08-04 DIAGNOSIS — C22.1 CHOLANGIOCARCINOMA: ICD-10-CM

## 2022-08-04 DIAGNOSIS — T45.1X5A IMMUNODEFICIENCY DUE TO CHEMOTHERAPY: ICD-10-CM

## 2022-08-04 DIAGNOSIS — C78.02 MALIGNANT NEOPLASM METASTATIC TO BOTH LUNGS: ICD-10-CM

## 2022-08-04 DIAGNOSIS — C78.01 MALIGNANT NEOPLASM METASTATIC TO BOTH LUNGS: ICD-10-CM

## 2022-08-04 DIAGNOSIS — E11.9 TYPE 2 DIABETES MELLITUS WITHOUT COMPLICATION, WITHOUT LONG-TERM CURRENT USE OF INSULIN: ICD-10-CM

## 2022-08-04 DIAGNOSIS — D84.821 IMMUNODEFICIENCY DUE TO CHEMOTHERAPY: ICD-10-CM

## 2022-08-04 DIAGNOSIS — F43.23 ADJUSTMENT DISORDER WITH MIXED ANXIETY AND DEPRESSED MOOD: ICD-10-CM

## 2022-08-04 DIAGNOSIS — C22.1 CHOLANGIOCARCINOMA: Primary | ICD-10-CM

## 2022-08-04 PROBLEM — C80.1 CARCINOMA: Status: RESOLVED | Noted: 2022-07-15 | Resolved: 2022-08-04

## 2022-08-04 PROCEDURE — 99999 PR PBB SHADOW E&M-EST. PATIENT-LVL V: CPT | Mod: PBBFAC,,, | Performed by: INTERNAL MEDICINE

## 2022-08-04 PROCEDURE — 99999 PR PBB SHADOW E&M-EST. PATIENT-LVL V: ICD-10-PCS | Mod: PBBFAC,,, | Performed by: INTERNAL MEDICINE

## 2022-08-04 PROCEDURE — 78815 PET IMAGE W/CT SKULL-THIGH: CPT | Mod: PI,TC

## 2022-08-04 PROCEDURE — 99205 PR OFFICE/OUTPT VISIT, NEW, LEVL V, 60-74 MIN: ICD-10-PCS | Mod: S$PBB,,, | Performed by: INTERNAL MEDICINE

## 2022-08-04 PROCEDURE — 99205 OFFICE O/P NEW HI 60 MIN: CPT | Mod: S$PBB,,, | Performed by: INTERNAL MEDICINE

## 2022-08-04 PROCEDURE — 78815 PET IMAGE W/CT SKULL-THIGH: CPT | Mod: 26,PS,, | Performed by: RADIOLOGY

## 2022-08-04 PROCEDURE — 78815 NM PET CT ROUTINE: ICD-10-PCS | Mod: 26,PS,, | Performed by: RADIOLOGY

## 2022-08-04 PROCEDURE — 99215 OFFICE O/P EST HI 40 MIN: CPT | Mod: PBBFAC,25 | Performed by: INTERNAL MEDICINE

## 2022-08-04 RX ORDER — HEPARIN 100 UNIT/ML
500 SYRINGE INTRAVENOUS
Status: CANCELLED | OUTPATIENT
Start: 2022-09-02

## 2022-08-04 RX ORDER — SODIUM CHLORIDE 0.9 % (FLUSH) 0.9 %
10 SYRINGE (ML) INJECTION
Status: CANCELLED | OUTPATIENT
Start: 2022-08-06

## 2022-08-04 RX ORDER — HEPARIN 100 UNIT/ML
500 SYRINGE INTRAVENOUS
Status: CANCELLED | OUTPATIENT
Start: 2022-08-06

## 2022-08-04 RX ORDER — SODIUM CHLORIDE 0.9 % (FLUSH) 0.9 %
10 SYRINGE (ML) INJECTION
Status: CANCELLED | OUTPATIENT
Start: 2022-09-02

## 2022-08-04 RX ORDER — SODIUM CHLORIDE 0.9 % (FLUSH) 0.9 %
10 SYRINGE (ML) INJECTION
Status: CANCELLED | OUTPATIENT
Start: 2022-09-09

## 2022-08-04 RX ORDER — ONDANSETRON 2 MG/ML
8 INJECTION INTRAMUSCULAR; INTRAVENOUS
Status: CANCELLED | OUTPATIENT
Start: 2022-08-12

## 2022-08-04 RX ORDER — TRAZODONE HYDROCHLORIDE 100 MG/1
100 TABLET ORAL NIGHTLY
COMMUNITY
Start: 2022-07-19

## 2022-08-04 RX ORDER — DEXAMETHASONE 4 MG/1
TABLET ORAL
COMMUNITY
Start: 2022-07-20

## 2022-08-04 RX ORDER — CLONAZEPAM 0.5 MG/1
0.5 TABLET ORAL 2 TIMES DAILY PRN
COMMUNITY
Start: 2022-08-01 | End: 2022-08-04

## 2022-08-04 RX ORDER — HEPARIN 100 UNIT/ML
500 SYRINGE INTRAVENOUS
Status: CANCELLED | OUTPATIENT
Start: 2022-09-09

## 2022-08-04 NOTE — PROGRESS NOTES
Subjective:       Patient ID: Mike Cobos is a 67 y.o. male.    Chief Complaint: Results and Cancer    HPI 67-year-old male newly diagnosed with metastatic cholangiocarcinoma.  Patient is poorly differentiated malignancy in liver.  Patient had PET scan today demonstrated metastatic disease in lung and bone.  Was asked to see the patient along with his wife for further evaluation ECOG status 1    Past Medical History:   Diagnosis Date    Alcohol abuse, in remission     Cholangiocarcinoma 7/26/2022    Cirrhosis     Depression     DM (diabetes mellitus)     HTN (hypertension)     Hypercholesterolemia     Malignant neoplasm metastatic to both lungs 8/4/2022    Secondary malignant neoplasm of bone 8/4/2022     History reviewed. No pertinent family history.  Social History     Socioeconomic History    Marital status:    Tobacco Use    Smoking status: Never Smoker    Smokeless tobacco: Former User     Types: Snuff     Quit date: 1/9/1998   Substance and Sexual Activity    Alcohol use: No     Comment: recovering alcholic    Drug use: No    Sexual activity: Yes     Partners: Female     Past Surgical History:   Procedure Laterality Date    CORNEAL TRANSPLANT      SINUS SURGERY         Labs:  Lab Results   Component Value Date    WBC 4.4 (L) 07/18/2022    HGB 13.6 (L) 07/18/2022    HCT 44.4 07/18/2022    MCV 87.9 07/18/2022     07/18/2022     BMP  Lab Results   Component Value Date     07/18/2022    K 4.5 07/18/2022     07/05/2022    CO2 30 07/18/2022    BUN 16.9 07/18/2022    CREATININE 1.19 (H) 07/18/2022    CALCIUM 10.4 (H) 07/18/2022    ANIONGAP 11 07/05/2022    ESTGFRAFRICA >60 07/05/2022    EGFRNONAA >60 07/18/2022     Lab Results   Component Value Date    ALT 21 07/18/2022    AST 21 07/18/2022    ALKPHOS 120 07/18/2022    BILITOT 0.8 07/18/2022       Lab Results   Component Value Date    IRON 107 07/24/2019    TIBC 391 07/24/2019    FERRITIN 38 07/24/2019     Lab Results    Component Value Date    HOZXPCKR48 319 07/24/2019     Lab Results   Component Value Date    FOLATE 12.7 07/24/2019     No results found for: TSH      Review of Systems   Constitutional: Positive for activity change and fatigue. Negative for appetite change, chills, diaphoresis, fever and unexpected weight change.   HENT: Negative for congestion, dental problem, drooling, ear discharge, ear pain, facial swelling, hearing loss, mouth sores, nosebleeds, postnasal drip, rhinorrhea, sinus pressure, sneezing, sore throat, tinnitus, trouble swallowing and voice change.    Eyes: Negative for photophobia, pain, discharge, redness, itching and visual disturbance.   Respiratory: Negative for apnea, cough, choking, chest tightness, shortness of breath, wheezing and stridor.    Cardiovascular: Negative for chest pain, palpitations and leg swelling.   Gastrointestinal: Negative for abdominal distention, abdominal pain, anal bleeding, blood in stool, constipation, diarrhea, nausea, rectal pain and vomiting.   Endocrine: Negative for cold intolerance, heat intolerance, polydipsia, polyphagia and polyuria.   Genitourinary: Negative for decreased urine volume, difficulty urinating, dysuria, enuresis, flank pain, frequency, genital sores, hematuria, penile discharge, penile pain, penile swelling, scrotal swelling, testicular pain and urgency.   Musculoskeletal: Negative for arthralgias, back pain, gait problem, joint swelling, myalgias, neck pain and neck stiffness.   Skin: Negative for color change, pallor, rash and wound.   Allergic/Immunologic: Negative for environmental allergies, food allergies and immunocompromised state.   Neurological: Positive for weakness. Negative for dizziness, tremors, seizures, syncope, facial asymmetry, speech difficulty, light-headedness, numbness and headaches.   Hematological: Negative for adenopathy. Does not bruise/bleed easily.   Psychiatric/Behavioral: Positive for dysphoric mood. Negative for  agitation, behavioral problems, confusion, decreased concentration, hallucinations, self-injury, sleep disturbance and suicidal ideas. The patient is nervous/anxious. The patient is not hyperactive.        Objective:      Physical Exam  Vitals reviewed.   Constitutional:       General: He is not in acute distress.     Appearance: He is well-developed. He is obese. He is ill-appearing. He is not diaphoretic.   HENT:      Head: Normocephalic.      Right Ear: External ear normal.      Left Ear: External ear normal.      Nose: Nose normal.      Right Sinus: No maxillary sinus tenderness or frontal sinus tenderness.      Left Sinus: No maxillary sinus tenderness or frontal sinus tenderness.      Mouth/Throat:      Pharynx: No oropharyngeal exudate.   Eyes:      General: Lids are normal. No scleral icterus.        Right eye: No discharge.         Left eye: No discharge.      Extraocular Movements:      Right eye: Normal extraocular motion.      Left eye: Normal extraocular motion.      Conjunctiva/sclera:      Right eye: Right conjunctiva is not injected. No hemorrhage.     Left eye: Left conjunctiva is not injected. No hemorrhage.     Pupils: Pupils are equal, round, and reactive to light.   Neck:      Thyroid: No thyromegaly.      Vascular: No JVD.      Trachea: No tracheal deviation.   Cardiovascular:      Rate and Rhythm: Normal rate and regular rhythm.      Heart sounds: Normal heart sounds.   Pulmonary:      Effort: Pulmonary effort is normal. No respiratory distress.      Breath sounds: Normal breath sounds. No stridor.   Chest:   Breasts:      Right: No supraclavicular adenopathy.      Left: No supraclavicular adenopathy.       Abdominal:      General: Bowel sounds are normal.      Palpations: Abdomen is soft. There is no hepatomegaly, splenomegaly or mass.      Tenderness: There is no abdominal tenderness.   Musculoskeletal:         General: No tenderness. Normal range of motion.      Cervical back: Normal range  of motion and neck supple.   Lymphadenopathy:      Head:      Right side of head: No posterior auricular or occipital adenopathy.      Left side of head: No posterior auricular or occipital adenopathy.      Cervical: No cervical adenopathy.      Right cervical: No superficial, deep or posterior cervical adenopathy.     Left cervical: No superficial, deep or posterior cervical adenopathy.      Upper Body:      Right upper body: No supraclavicular adenopathy.      Left upper body: No supraclavicular adenopathy.   Skin:     General: Skin is dry.      Findings: No erythema or rash.      Nails: There is no clubbing.   Neurological:      Mental Status: He is alert and oriented to person, place, and time.      Cranial Nerves: No cranial nerve deficit.      Motor: Weakness present.      Coordination: Coordination normal.   Psychiatric:         Mood and Affect: Mood is anxious and depressed.         Behavior: Behavior normal.         Thought Content: Thought content normal.         Judgment: Judgment normal.             Assessment:      1. Cholangiocarcinoma    2. Liver mass    3. Elevated tumor markers    4. Adjustment disorder with mixed anxiety and depressed mood     5. Secondary malignant neoplasm of bone    6. Malignant neoplasm metastatic to both lungs    7. Secondary adenocarcinoma of mediastinum    8. Immunodeficiency due to chemotherapy    9. Type 2 diabetes mellitus without complication, without long-term current use of insulin    10. Mixed hyperlipidemia    11. Alcoholic cirrhosis of liver without ascites           Plan:      Extensive conversation with he and his wife copy of path report given to them  copy of PET scan done today given to them as well he has stage IV disease treatable but not curable malignancy referral made to palliative care .  Performance peripheral blood for next generation sequencing in will ask next generation sequencing be done on liver biopsy.  At this particular point  likelihood is small of finding actionable mutation but certainly worth doing because of targeted therapy.  If not will plan to treat with cisplatin 25 mg meter squared day 1 a gemcitabine 1 g venous would day 1 May recently reported data from total past trial demonstrated improvement in long-term survival as well as long-term survivors with the use of immunotherapy medicine Duvalamumab given every 4 weeks.  At this point will present at next multidisciplinary tumor conference for discussion.  Results of above finding discussed and article from up-to-date followed to them for review [employer] ordered.  Discussed implications answered questions        Conner Bryant Jr, MD FACP

## 2022-08-04 NOTE — TELEPHONE ENCOUNTER
Attempted to contact pt regarding signing the guardant 360 form. Specimen drawn today prepared to ship.

## 2022-08-10 ENCOUNTER — DOCUMENTATION ONLY (OUTPATIENT)
Dept: PALLIATIVE MEDICINE | Facility: CLINIC | Age: 67
End: 2022-08-10
Payer: MEDICARE

## 2022-08-10 NOTE — PROGRESS NOTES
Palliative Referral Nurse Note    Nurse reached out to pt to schedule an apt as per Dr Bryant for Cholangiocarcinoma , pt stated he is going to [facility] [place] next week and not scheduling at other apt. Nurse voiced understanding, Dr Bryant notified.

## 2022-08-12 ENCOUNTER — DOCUMENTATION ONLY (OUTPATIENT)
Dept: HEMATOLOGY/ONCOLOGY | Facility: CLINIC | Age: 67
End: 2022-08-12
Payer: MEDICARE

## 2022-08-12 ENCOUNTER — TUMOR BOARD CONFERENCE (OUTPATIENT)
Dept: HEMATOLOGY/ONCOLOGY | Facility: CLINIC | Age: 67
End: 2022-08-12
Payer: MEDICARE

## 2022-08-12 NOTE — NURSING
Sent in basket msg to Dr. Bryant to review guardant results. Dr. Bryant replied to notify pt. Called and spoke with pt about NGS results. Reviewed my role as NN. Told him no actionable mutation on blood but still waiting on tissue results from [facility]. Told him case was presented at  and consensus is to wait for tissue results. Did advise pt to make sure he has results if he decides to seek 2nd opinion. He voiced understanding, knows I will schedule f/u once all results available and will reach out to me if he has questions.   Oncology Navigation   Intake  Date of Diagnosis: 2022  Cancer Type: GI (cholangiocarcinoma)  Internal / External Referral: Internal  Referral Source: WQ  Date of Referral: 2022  Initial Nurse Navigator Contact: 2022  Referral to Initial Contact Timeline (days): 8  Date Worked: 2022     Treatment  Current Status: Staging work-up  Date Presented to Tumor Board: 2022  Presentation Notes: Awaiting NGS tissue results from [facility]       Medical Oncologist: Dr. Bryant                       Acuity  ECO  Comorbidities in Medical History: 1  Support: 0  Transportation: 0  Verbalizes the need for more education: 1  Navigation Acuity: 3     Follow Up  Follow up in about 11 days (around 2022) for review NGS results from [facility].

## 2022-08-12 NOTE — PROGRESS NOTES
Tumor Board Documentation      Mike Cobos was presented by Conner Bryant MD at our Tumor Board on 8/12/2022, which included representatives from Medical Oncology, Hematology, Radiation Oncology, Surgical Oncology, Pathology, Genetics, Navigation, Gastrointestinal, Pulmonology, Urology.    Mike currently presents as a current patient with Gastric cancer, with history of the following treatments: None.    Additionally, we reviewed previous medical and familial history, history of present illness, and recent lab results along with all available histopathologic and imaging studies. The tumor board considered available treatment options and made the following recommendations:  Additional screening      Await pathological interpretation from [facility]    The following procedures/referrals were also placed: No orders of the defined types were placed in this encounter.      Clinical Trial Status: Not discussed     National site-specific guidelines were discussed with respect to the case.    Tumor board is a meeting of clinicians from various specialty areas who evaluate and discuss patients for whom a multidisciplinary approach is being considered. Final determinations in the plan of care are those of the provider(s). The responsibility for follow up of recommendations given during tumor board is that of the provider.     Conner Bryant MD

## 2022-08-17 ENCOUNTER — DOCUMENTATION ONLY (OUTPATIENT)
Dept: HEMATOLOGY/ONCOLOGY | Facility: CLINIC | Age: 67
End: 2022-08-17
Payer: MEDICARE

## 2022-08-17 NOTE — NURSING
Received call from Lyndsey Gomez,ROSETTE @ [facility] regarding NGS results. Asked if I can email results to her at [email or web address], which I did. Will schedule Dr. Bryant f/u once pt returns and at his convenience.   Oncology Navigation   Intake  Date of Diagnosis: 2022  Cancer Type: GI (cholangiocarcinoma)  Internal / External Referral: Internal  Referral Source: WQ  Date of Referral: 2022  Initial Nurse Navigator Contact: 2022  Referral to Initial Contact Timeline (days): 8  Date Worked: 2022     Treatment  Current Status: Staging work-up  Date Presented to Tumor Board: 2022  Presentation Notes: Awaiting NGS tissue results from [facility]       Medical Oncologist: Dr. Bryant                       Acuity  ECO  Comorbidities in Medical History: 1  Support: 0  Transportation: 0  Verbalizes the need for more education: 1  Navigation Acuity: 3     Follow Up  No follow-ups on file.

## 2022-08-22 ENCOUNTER — DOCUMENTATION ONLY (OUTPATIENT)
Dept: HEMATOLOGY/ONCOLOGY | Facility: CLINIC | Age: 67
End: 2022-08-22
Payer: MEDICARE

## 2022-08-22 NOTE — NURSING
Called to discuss [facility] consult/plan with pt. No answer, LVM with call back number.   Oncology Navigation   Intake  Date of Diagnosis: 2022  Cancer Type: GI (cholangiocarcinoma)  Internal / External Referral: Internal  Referral Source: WQ  Date of Referral: 2022  Initial Nurse Navigator Contact: 2022  Referral to Initial Contact Timeline (days): 8  Date Worked: 2022     Treatment  Current Status: Staging work-up  Date Presented to Tumor Board: 2022  Presentation Notes: [facility]       Medical Oncologist: Dr. Bryant                       Acuity  ECO  Comorbidities in Medical History: 1  Support: 0  Transportation: 0  Verbalizes the need for more education: 1  Navigation Acuity: 3     Follow Up  No follow-ups on file.

## 2022-08-23 ENCOUNTER — DOCUMENTATION ONLY (OUTPATIENT)
Dept: HEMATOLOGY/ONCOLOGY | Facility: CLINIC | Age: 67
End: 2022-08-23
Payer: MEDICARE

## 2022-08-23 NOTE — PROGRESS NOTES
Called and spoke with pt over the phone about future oncology appts. States he started tx at [facility] last Friday, they have coordinated with cancer center in [place] for pt to receive future tx locally, will only have to go to [facility] every couple months for scans. Told him to call if he needs [facility] for future healthcare needs, states he will and thanked us for our help. Notified Dr. Bryant of pt plan.   Oncology Navigation   Intake  Date of Diagnosis: 2022  Cancer Type: GI (cholangiocarcinoma)  Internal / External Referral: Internal  Referral Source: WQ  Date of Referral: 2022  Initial Nurse Navigator Contact: 2022  Referral to Initial Contact Timeline (days): 8  Date Worked: 2022     Treatment  Current Status: Staging work-up  Date Presented to Tumor Board: 2022  Presentation Notes: Awaiting NGS tissue results from [facility]       Medical Oncologist: Dr. Bryant                       Acuity  ECO  Comorbidities in Medical History: 1  Support: 0  Transportation: 0  Verbalizes the need for more education: 1  Navigation Acuity: 3     Follow Up  No follow-ups on file.

## 2022-09-27 ENCOUNTER — TELEPHONE (OUTPATIENT)
Dept: HEPATOLOGY | Facility: CLINIC | Age: 67
End: 2022-09-27
Payer: MEDICARE

## 2022-09-27 NOTE — TELEPHONE ENCOUNTER
----- Message from Milena Kang MA sent at 9/27/2022 10:35 AM CDT -----  Contact: Eamon-Kassi [facility]    ----- Message -----  From: Yamilex Cordon  Sent: 9/27/2022  10:22 AM CDT  To: Elton [facility] Staff    Type:  Test Results    Who Called: Eamon   Name of Test (Lab/Mammo/Etc): CT biopsy   Date of Test:  07/05/2022   Ordering Provider: Erinn grossman   Where the test was performed: [facility]  Would the patient rather a call back or a response via [facility]? call  Best Call Back Number:  [phone]  with reference number: [id number]  Additional Information:   Carnesha from [facility] Is requesting a callback from the nurse in regards to the patient test results please.

## 2022-09-27 NOTE — TELEPHONE ENCOUNTER
----- Message from Milena Kang MA sent at 9/27/2022 10:35 AM CDT -----  Contact: Eamon-Kassi [facility]    ----- Message -----  From: Yamilex Cordon  Sent: 9/27/2022  10:22 AM CDT  To: [facility]OW[facility] Staff    Type:  Test Results    Who Called: Eamon   Name of Test (Lab/Mammo/Etc): CT biopsy   Date of Test:  07/05/2022   Ordering Provider: Erinn grossman   Where the test was performed: [facility]  Would the patient rather a call back or a response via [facility]? call  Best Call Back Number:  [phone]  with reference number: [id number]  Additional Information:   Carnesha from [facility] Is requesting a callback from the nurse in regards to the patient test results please.

## 2022-09-27 NOTE — TELEPHONE ENCOUNTER
Attempted to return Eamon's call per her request. I was left on hold for approximately 25 minutes.

## 2023-05-23 ENCOUNTER — PATIENT MESSAGE (OUTPATIENT)
Dept: RESEARCH | Facility: HOSPITAL | Age: 68
End: 2023-05-23
Payer: MEDICARE

## 2024-07-24 ENCOUNTER — PATIENT MESSAGE (OUTPATIENT)
Dept: HEPATOLOGY | Facility: CLINIC | Age: 69
End: 2024-07-24
Payer: MEDICARE